# Patient Record
Sex: MALE | Race: OTHER | URBAN - METROPOLITAN AREA
[De-identification: names, ages, dates, MRNs, and addresses within clinical notes are randomized per-mention and may not be internally consistent; named-entity substitution may affect disease eponyms.]

---

## 2022-04-29 ENCOUNTER — LAB ENCOUNTER (OUTPATIENT)
Dept: LAB | Age: 22
End: 2022-04-29
Attending: STUDENT IN AN ORGANIZED HEALTH CARE EDUCATION/TRAINING PROGRAM
Payer: COMMERCIAL

## 2022-04-29 ENCOUNTER — OFFICE VISIT (OUTPATIENT)
Dept: FAMILY MEDICINE CLINIC | Facility: CLINIC | Age: 22
End: 2022-04-29
Payer: COMMERCIAL

## 2022-04-29 VITALS
TEMPERATURE: 98 F | RESPIRATION RATE: 20 BRPM | HEART RATE: 86 BPM | HEIGHT: 70 IN | BODY MASS INDEX: 30.61 KG/M2 | WEIGHT: 213.81 LBS

## 2022-04-29 DIAGNOSIS — Z13.89 SCREENING FOR GENITOURINARY CONDITION: ICD-10-CM

## 2022-04-29 DIAGNOSIS — Z13.220 SCREENING FOR LIPID DISORDERS: ICD-10-CM

## 2022-04-29 DIAGNOSIS — R40.4 EPISODES OF STARING: ICD-10-CM

## 2022-04-29 DIAGNOSIS — E66.09 CLASS 1 OBESITY DUE TO EXCESS CALORIES WITHOUT SERIOUS COMORBIDITY WITH BODY MASS INDEX (BMI) OF 30.0 TO 30.9 IN ADULT: ICD-10-CM

## 2022-04-29 DIAGNOSIS — Z13.0 SCREENING FOR IRON DEFICIENCY ANEMIA: ICD-10-CM

## 2022-04-29 DIAGNOSIS — Z23 NEED FOR VACCINATION: ICD-10-CM

## 2022-04-29 DIAGNOSIS — Z00.00 ROUTINE GENERAL MEDICAL EXAMINATION AT A HEALTH CARE FACILITY: ICD-10-CM

## 2022-04-29 DIAGNOSIS — Z00.00 ROUTINE GENERAL MEDICAL EXAMINATION AT A HEALTH CARE FACILITY: Primary | ICD-10-CM

## 2022-04-29 DIAGNOSIS — Z13.29 SCREENING FOR THYROID DISORDER: ICD-10-CM

## 2022-04-29 PROBLEM — E66.811 CLASS 1 OBESITY DUE TO EXCESS CALORIES WITHOUT SERIOUS COMORBIDITY WITH BODY MASS INDEX (BMI) OF 30.0 TO 30.9 IN ADULT: Status: ACTIVE | Noted: 2022-04-29

## 2022-04-29 LAB
ALBUMIN SERPL-MCNC: 4.4 G/DL (ref 3.4–5)
ALBUMIN/GLOB SERPL: 1.3 {RATIO} (ref 1–2)
ALP LIVER SERPL-CCNC: 53 U/L
ALT SERPL-CCNC: 116 U/L
ANION GAP SERPL CALC-SCNC: 4 MMOL/L (ref 0–18)
AST SERPL-CCNC: 57 U/L (ref 15–37)
BASOPHILS # BLD AUTO: 0.02 X10(3) UL (ref 0–0.2)
BASOPHILS NFR BLD AUTO: 0.2 %
BILIRUB SERPL-MCNC: 0.5 MG/DL (ref 0.1–2)
BILIRUB UR QL STRIP.AUTO: NEGATIVE
BUN BLD-MCNC: 17 MG/DL (ref 7–18)
CALCIUM BLD-MCNC: 9.5 MG/DL (ref 8.5–10.1)
CHLORIDE SERPL-SCNC: 106 MMOL/L (ref 98–112)
CHOLEST SERPL-MCNC: 185 MG/DL (ref ?–200)
CLARITY UR REFRACT.AUTO: CLEAR
CO2 SERPL-SCNC: 28 MMOL/L (ref 21–32)
COLOR UR AUTO: YELLOW
CREAT BLD-MCNC: 0.96 MG/DL
EOSINOPHIL # BLD AUTO: 0.13 X10(3) UL (ref 0–0.7)
EOSINOPHIL NFR BLD AUTO: 1.5 %
ERYTHROCYTE [DISTWIDTH] IN BLOOD BY AUTOMATED COUNT: 12.6 %
EST. AVERAGE GLUCOSE BLD GHB EST-MCNC: 120 MG/DL (ref 68–126)
FASTING PATIENT LIPID ANSWER: YES
FASTING STATUS PATIENT QL REPORTED: YES
GLOBULIN PLAS-MCNC: 3.5 G/DL (ref 2.8–4.4)
GLUCOSE BLD-MCNC: 82 MG/DL (ref 70–99)
GLUCOSE UR STRIP.AUTO-MCNC: NEGATIVE MG/DL
HBA1C MFR BLD: 5.8 % (ref ?–5.7)
HCT VFR BLD AUTO: 46.6 %
HDLC SERPL-MCNC: 33 MG/DL (ref 40–59)
HGB BLD-MCNC: 15.4 G/DL
IMM GRANULOCYTES # BLD AUTO: 0.02 X10(3) UL (ref 0–1)
IMM GRANULOCYTES NFR BLD: 0.2 %
KETONES UR STRIP.AUTO-MCNC: NEGATIVE MG/DL
LDLC SERPL CALC-MCNC: 127 MG/DL (ref ?–100)
LEUKOCYTE ESTERASE UR QL STRIP.AUTO: NEGATIVE
LYMPHOCYTES # BLD AUTO: 3.2 X10(3) UL (ref 1–4)
LYMPHOCYTES NFR BLD AUTO: 37.5 %
MCH RBC QN AUTO: 30.9 PG (ref 26–34)
MCHC RBC AUTO-ENTMCNC: 33 G/DL (ref 31–37)
MCV RBC AUTO: 93.4 FL
MONOCYTES # BLD AUTO: 0.74 X10(3) UL (ref 0.1–1)
MONOCYTES NFR BLD AUTO: 8.7 %
NEUTROPHILS # BLD AUTO: 4.43 X10 (3) UL (ref 1.5–7.7)
NEUTROPHILS # BLD AUTO: 4.43 X10(3) UL (ref 1.5–7.7)
NEUTROPHILS NFR BLD AUTO: 51.9 %
NITRITE UR QL STRIP.AUTO: NEGATIVE
NONHDLC SERPL-MCNC: 152 MG/DL (ref ?–130)
OSMOLALITY SERPL CALC.SUM OF ELEC: 287 MOSM/KG (ref 275–295)
PH UR STRIP.AUTO: 5 [PH] (ref 5–8)
PLATELET # BLD AUTO: 365 10(3)UL (ref 150–450)
POTASSIUM SERPL-SCNC: 4.2 MMOL/L (ref 3.5–5.1)
PROT SERPL-MCNC: 7.9 G/DL (ref 6.4–8.2)
PROT UR STRIP.AUTO-MCNC: NEGATIVE MG/DL
RBC # BLD AUTO: 4.99 X10(6)UL
SODIUM SERPL-SCNC: 138 MMOL/L (ref 136–145)
SP GR UR STRIP.AUTO: 1.02 (ref 1–1.03)
TRIGL SERPL-MCNC: 140 MG/DL (ref 30–149)
TSI SER-ACNC: 1.4 MIU/ML (ref 0.36–3.74)
UROBILINOGEN UR STRIP.AUTO-MCNC: <2 MG/DL
VLDLC SERPL CALC-MCNC: 25 MG/DL (ref 0–30)
WBC # BLD AUTO: 8.5 X10(3) UL (ref 4–11)

## 2022-04-29 PROCEDURE — 99385 PREV VISIT NEW AGE 18-39: CPT | Performed by: STUDENT IN AN ORGANIZED HEALTH CARE EDUCATION/TRAINING PROGRAM

## 2022-04-29 PROCEDURE — 90471 IMMUNIZATION ADMIN: CPT | Performed by: STUDENT IN AN ORGANIZED HEALTH CARE EDUCATION/TRAINING PROGRAM

## 2022-04-29 PROCEDURE — 90651 9VHPV VACCINE 2/3 DOSE IM: CPT | Performed by: STUDENT IN AN ORGANIZED HEALTH CARE EDUCATION/TRAINING PROGRAM

## 2022-04-29 PROCEDURE — 83036 HEMOGLOBIN GLYCOSYLATED A1C: CPT

## 2022-04-29 PROCEDURE — 80061 LIPID PANEL: CPT

## 2022-04-29 PROCEDURE — 81001 URINALYSIS AUTO W/SCOPE: CPT

## 2022-04-29 PROCEDURE — 80053 COMPREHEN METABOLIC PANEL: CPT

## 2022-04-29 PROCEDURE — 84443 ASSAY THYROID STIM HORMONE: CPT

## 2022-04-29 PROCEDURE — 85025 COMPLETE CBC W/AUTO DIFF WBC: CPT

## 2022-04-29 PROCEDURE — 3008F BODY MASS INDEX DOCD: CPT | Performed by: STUDENT IN AN ORGANIZED HEALTH CARE EDUCATION/TRAINING PROGRAM

## 2022-05-01 PROBLEM — R40.4 EPISODES OF STARING: Status: ACTIVE | Noted: 2022-05-01

## 2022-05-31 ENCOUNTER — TELEPHONE (OUTPATIENT)
Dept: FAMILY MEDICINE CLINIC | Facility: CLINIC | Age: 22
End: 2022-05-31

## 2022-05-31 DIAGNOSIS — Z23 NEED FOR VACCINATION AGAINST HUMAN PAPILLOMAVIRUS: Primary | ICD-10-CM

## 2022-06-01 ENCOUNTER — NURSE ONLY (OUTPATIENT)
Dept: FAMILY MEDICINE CLINIC | Facility: CLINIC | Age: 22
End: 2022-06-01
Payer: COMMERCIAL

## 2022-06-01 PROCEDURE — 90651 9VHPV VACCINE 2/3 DOSE IM: CPT | Performed by: STUDENT IN AN ORGANIZED HEALTH CARE EDUCATION/TRAINING PROGRAM

## 2022-06-01 PROCEDURE — 90471 IMMUNIZATION ADMIN: CPT | Performed by: STUDENT IN AN ORGANIZED HEALTH CARE EDUCATION/TRAINING PROGRAM

## 2022-06-27 ENCOUNTER — LAB ENCOUNTER (OUTPATIENT)
Dept: LAB | Age: 22
End: 2022-06-27
Attending: STUDENT IN AN ORGANIZED HEALTH CARE EDUCATION/TRAINING PROGRAM
Payer: COMMERCIAL

## 2022-06-27 DIAGNOSIS — R74.8 ELEVATED LIVER ENZYMES: ICD-10-CM

## 2022-06-27 LAB
ALBUMIN SERPL-MCNC: 4.1 G/DL (ref 3.4–5)
ALP LIVER SERPL-CCNC: 55 U/L
ALT SERPL-CCNC: 94 U/L
AST SERPL-CCNC: 49 U/L (ref 15–37)
BILIRUB DIRECT SERPL-MCNC: <0.1 MG/DL (ref 0–0.2)
BILIRUB SERPL-MCNC: 0.3 MG/DL (ref 0.1–2)
PROT SERPL-MCNC: 7.9 G/DL (ref 6.4–8.2)

## 2022-06-27 PROCEDURE — 80076 HEPATIC FUNCTION PANEL: CPT

## 2022-06-27 PROCEDURE — 36415 COLL VENOUS BLD VENIPUNCTURE: CPT

## 2022-06-29 DIAGNOSIS — R74.8 ELEVATED LIVER ENZYMES: Primary | ICD-10-CM

## 2022-07-29 ENCOUNTER — LAB ENCOUNTER (OUTPATIENT)
Dept: LAB | Age: 22
End: 2022-07-29
Attending: NURSE PRACTITIONER
Payer: COMMERCIAL

## 2022-07-29 DIAGNOSIS — R74.8 ELEVATED LIVER ENZYMES: ICD-10-CM

## 2022-07-29 LAB
ALBUMIN SERPL-MCNC: 4.1 G/DL (ref 3.4–5)
ALP LIVER SERPL-CCNC: 61 U/L
ALT SERPL-CCNC: 102 U/L
AST SERPL-CCNC: 46 U/L (ref 15–37)
BILIRUB DIRECT SERPL-MCNC: <0.1 MG/DL (ref 0–0.2)
BILIRUB SERPL-MCNC: 0.4 MG/DL (ref 0.1–2)
PROT SERPL-MCNC: 8.1 G/DL (ref 6.4–8.2)

## 2022-07-29 PROCEDURE — 80076 HEPATIC FUNCTION PANEL: CPT | Performed by: NURSE PRACTITIONER

## 2022-08-26 ENCOUNTER — HOSPITAL ENCOUNTER (OUTPATIENT)
Dept: ULTRASOUND IMAGING | Age: 22
Discharge: HOME | End: 2022-08-26
Attending: NURSE PRACTITIONER
Payer: COMMERCIAL

## 2022-08-26 DIAGNOSIS — R74.8 ELEVATED LIVER ENZYMES: ICD-10-CM

## 2022-08-26 PROCEDURE — 76700 US EXAM ABDOM COMPLETE: CPT | Performed by: NURSE PRACTITIONER

## 2022-09-01 ENCOUNTER — TELEPHONE (OUTPATIENT)
Dept: ADMINISTRATIVE | Age: 22
End: 2022-09-01

## 2022-09-06 ENCOUNTER — HOSPITAL ENCOUNTER (OUTPATIENT)
Dept: MRI IMAGING | Age: 22
Discharge: HOME | End: 2022-09-06
Attending: NURSE PRACTITIONER
Payer: COMMERCIAL

## 2022-09-06 DIAGNOSIS — R16.0 LIVER MASS: ICD-10-CM

## 2022-09-06 DIAGNOSIS — K76.0 FATTY INFILTRATION OF LIVER: ICD-10-CM

## 2022-09-06 PROCEDURE — 74183 MRI ABD W/O CNTR FLWD CNTR: CPT | Performed by: NURSE PRACTITIONER

## 2022-09-06 PROCEDURE — A9581 GADOXETATE DISODIUM INJ: HCPCS | Performed by: NURSE PRACTITIONER

## 2022-09-23 ENCOUNTER — LAB ENCOUNTER (OUTPATIENT)
Dept: LAB | Age: 22
End: 2022-09-23
Attending: STUDENT IN AN ORGANIZED HEALTH CARE EDUCATION/TRAINING PROGRAM

## 2022-09-23 ENCOUNTER — OFFICE VISIT (OUTPATIENT)
Dept: FAMILY MEDICINE CLINIC | Facility: CLINIC | Age: 22
End: 2022-09-23

## 2022-09-23 VITALS
BODY MASS INDEX: 30.49 KG/M2 | SYSTOLIC BLOOD PRESSURE: 110 MMHG | WEIGHT: 213 LBS | HEART RATE: 88 BPM | HEIGHT: 70 IN | DIASTOLIC BLOOD PRESSURE: 70 MMHG | RESPIRATION RATE: 20 BRPM | TEMPERATURE: 98 F

## 2022-09-23 DIAGNOSIS — K76.89 FOCAL NODULAR HYPERPLASIA OF LIVER: ICD-10-CM

## 2022-09-23 DIAGNOSIS — K76.0 NAFLD (NONALCOHOLIC FATTY LIVER DISEASE): ICD-10-CM

## 2022-09-23 DIAGNOSIS — K76.0 NAFLD (NONALCOHOLIC FATTY LIVER DISEASE): Primary | ICD-10-CM

## 2022-09-23 PROCEDURE — 86803 HEPATITIS C AB TEST: CPT

## 2022-09-23 PROCEDURE — 87340 HEPATITIS B SURFACE AG IA: CPT

## 2022-09-23 PROCEDURE — 86706 HEP B SURFACE ANTIBODY: CPT

## 2022-09-23 PROCEDURE — 3074F SYST BP LT 130 MM HG: CPT | Performed by: STUDENT IN AN ORGANIZED HEALTH CARE EDUCATION/TRAINING PROGRAM

## 2022-09-23 PROCEDURE — 86709 HEPATITIS A IGM ANTIBODY: CPT

## 2022-09-23 PROCEDURE — 3078F DIAST BP <80 MM HG: CPT | Performed by: STUDENT IN AN ORGANIZED HEALTH CARE EDUCATION/TRAINING PROGRAM

## 2022-09-23 PROCEDURE — 86708 HEPATITIS A ANTIBODY: CPT

## 2022-09-23 PROCEDURE — 3008F BODY MASS INDEX DOCD: CPT | Performed by: STUDENT IN AN ORGANIZED HEALTH CARE EDUCATION/TRAINING PROGRAM

## 2022-09-23 PROCEDURE — 99214 OFFICE O/P EST MOD 30 MIN: CPT | Performed by: STUDENT IN AN ORGANIZED HEALTH CARE EDUCATION/TRAINING PROGRAM

## 2022-09-24 LAB
HAV AB SER QL IA: REACTIVE
HAV IGM SER QL: NONREACTIVE
HBV SURFACE AB SER QL: NONREACTIVE
HBV SURFACE AB SERPL IA-ACNC: <3.1 MIU/ML
HBV SURFACE AG SER-ACNC: <0.1 [IU]/L
HBV SURFACE AG SERPL QL IA: NONREACTIVE
HCV AB SERPL QL IA: NONREACTIVE

## 2022-11-01 ENCOUNTER — TELEPHONE (OUTPATIENT)
Dept: FAMILY MEDICINE CLINIC | Facility: CLINIC | Age: 22
End: 2022-11-01

## 2022-11-01 DIAGNOSIS — Z23 NEED FOR VACCINATION: Primary | ICD-10-CM

## 2022-11-01 NOTE — TELEPHONE ENCOUNTER
Lm on vm to cb. Please let pt know that it is the Hep B that he needs, not Hep A. Thank you. Then to Dr. Vesna Mcdonough to sign orders. Per labs done 9/23/2022:    He has antibodies against Hep A, so no need for vaccination against Hep A. He does not have protection against Hep B, so I recommend Hep B vaccination to protect the liver since it is more susceptible to infection [place]. Please see pended orders and approve if appropriate. Thank you.

## 2022-11-11 ENCOUNTER — NURSE ONLY (OUTPATIENT)
Dept: FAMILY MEDICINE CLINIC | Facility: CLINIC | Age: 22
End: 2022-11-11
Payer: COMMERCIAL

## 2022-11-11 PROCEDURE — 90651 9VHPV VACCINE 2/3 DOSE IM: CPT | Performed by: STUDENT IN AN ORGANIZED HEALTH CARE EDUCATION/TRAINING PROGRAM

## 2022-11-11 PROCEDURE — 90471 IMMUNIZATION ADMIN: CPT | Performed by: STUDENT IN AN ORGANIZED HEALTH CARE EDUCATION/TRAINING PROGRAM

## 2022-11-11 PROCEDURE — 90472 IMMUNIZATION ADMIN EACH ADD: CPT | Performed by: STUDENT IN AN ORGANIZED HEALTH CARE EDUCATION/TRAINING PROGRAM

## 2022-11-11 PROCEDURE — 90746 HEPB VACCINE 3 DOSE ADULT IM: CPT | Performed by: STUDENT IN AN ORGANIZED HEALTH CARE EDUCATION/TRAINING PROGRAM

## 2022-11-11 NOTE — PROGRESS NOTES
Pt presented to receive HPV and Hep B vaccine. HPV was given in Left Deltoid and Hep B was given in his right deltoid. Patient tolerated both shots well and was informed to return in 6 months for his second dose.

## 2023-02-08 ENCOUNTER — OFFICE VISIT (OUTPATIENT)
Dept: FAMILY MEDICINE CLINIC | Facility: CLINIC | Age: 23
End: 2023-02-08
Payer: COMMERCIAL

## 2023-02-08 VITALS
DIASTOLIC BLOOD PRESSURE: 80 MMHG | RESPIRATION RATE: 18 BRPM | SYSTOLIC BLOOD PRESSURE: 102 MMHG | OXYGEN SATURATION: 99 % | BODY MASS INDEX: 31.5 KG/M2 | WEIGHT: 220 LBS | TEMPERATURE: 98 F | HEART RATE: 79 BPM | HEIGHT: 70 IN

## 2023-02-08 DIAGNOSIS — H65.03 NON-RECURRENT ACUTE SEROUS OTITIS MEDIA OF BOTH EARS: ICD-10-CM

## 2023-02-08 DIAGNOSIS — J01.10 ACUTE NON-RECURRENT FRONTAL SINUSITIS: Primary | ICD-10-CM

## 2023-02-08 PROCEDURE — 99214 OFFICE O/P EST MOD 30 MIN: CPT | Performed by: STUDENT IN AN ORGANIZED HEALTH CARE EDUCATION/TRAINING PROGRAM

## 2023-02-08 PROCEDURE — 3074F SYST BP LT 130 MM HG: CPT | Performed by: STUDENT IN AN ORGANIZED HEALTH CARE EDUCATION/TRAINING PROGRAM

## 2023-02-08 PROCEDURE — 3079F DIAST BP 80-89 MM HG: CPT | Performed by: STUDENT IN AN ORGANIZED HEALTH CARE EDUCATION/TRAINING PROGRAM

## 2023-02-08 PROCEDURE — 3008F BODY MASS INDEX DOCD: CPT | Performed by: STUDENT IN AN ORGANIZED HEALTH CARE EDUCATION/TRAINING PROGRAM

## 2023-02-08 RX ORDER — AMOXICILLIN AND CLAVULANATE POTASSIUM 875; 125 MG/1; MG/1
1 TABLET, FILM COATED ORAL 2 TIMES DAILY
Qty: 20 TABLET | Refills: 0 | Status: SHIPPED | OUTPATIENT
Start: 2023-02-08 | End: 2023-02-18

## 2024-01-22 ENCOUNTER — OFFICE VISIT (OUTPATIENT)
Dept: FAMILY MEDICINE CLINIC | Facility: CLINIC | Age: 24
End: 2024-01-22
Payer: COMMERCIAL

## 2024-01-22 VITALS
HEART RATE: 96 BPM | WEIGHT: 220 LBS | BODY MASS INDEX: 31.5 KG/M2 | OXYGEN SATURATION: 98 % | DIASTOLIC BLOOD PRESSURE: 72 MMHG | SYSTOLIC BLOOD PRESSURE: 128 MMHG | TEMPERATURE: 98 F | HEIGHT: 70 IN

## 2024-01-22 DIAGNOSIS — R09.81 NASAL CONGESTION: ICD-10-CM

## 2024-01-22 DIAGNOSIS — J02.9 SORE THROAT: ICD-10-CM

## 2024-01-22 DIAGNOSIS — H69.91 ACUTE DYSFUNCTION OF RIGHT EUSTACHIAN TUBE: ICD-10-CM

## 2024-01-22 DIAGNOSIS — J01.00 ACUTE NON-RECURRENT MAXILLARY SINUSITIS: Primary | ICD-10-CM

## 2024-01-22 LAB
CONTROL LINE PRESENT WITH A CLEAR BACKGROUND (YES/NO): YES YES/NO
COVID19 BINAX NOW ANTIGEN: NOT DETECTED
KIT EXPIRATION DATE: NORMAL DATE
KIT LOT #: NORMAL NUMERIC
POCT EXPIRATION DATE: NORMAL

## 2024-01-22 PROCEDURE — 87637 SARSCOV2&INF A&B&RSV AMP PRB: CPT

## 2024-01-22 PROCEDURE — 87081 CULTURE SCREEN ONLY: CPT

## 2024-01-22 RX ORDER — AMOXICILLIN AND CLAVULANATE POTASSIUM 875; 125 MG/1; MG/1
1 TABLET, FILM COATED ORAL 2 TIMES DAILY
Qty: 20 TABLET | Refills: 0 | Status: SHIPPED | OUTPATIENT
Start: 2024-01-22 | End: 2024-02-01

## 2024-01-22 NOTE — PATIENT INSTRUCTIONS
Take the antibiotic with food.  Take a probiotic while you are on the antibiotic, such as Align (this is a capsule that is available over-the-counter), or organic yogurt  Drink plenty of fluids and electrolytes.    May continue symptomatic treatment as needed unless otherwise contraindicated including but not limited to:   Mucinex DM or Robitussin DM as needed  Nasal saline rinse such as Ocean nasal spray  Tylenol or Motrin as needed.   Oral antihistamine such as Zyrtec/Allegra/Xyzal or nasal Astepro.   Nasal steroid such as Flonase or Nasacort.      Schedule annual physical with

## 2024-01-22 NOTE — PROGRESS NOTES
[facility] Family [facility] Office Note  Chief Complaint:   Chief Complaint   Patient presents with    Nasal Congestion     Congestion/ runny nose & sore throat all started 1/17/24 dayquil helps a little   Neg covid 1/19/24       HPI:   This is a 23 year old male coming in for nasal congestion, sinus pressure, sore throat, and cough since 1/17. Also experiencing pressure in right ear. Cough is dry. Denies any shortness of breath, wheezing, chest tightness, or fevers.     Reports he took a home covid test on 1/19 which was negative. His fiance is sick with similar symptoms. Denies any other known exposures.     OTC: [place], Dayquil,    History reviewed. No pertinent past medical history.  History reviewed. No pertinent surgical history.  Social History:  Social History     Socioeconomic History    Marital status: Single   Tobacco Use    Smoking status: Never    Smokeless tobacco: Never   Vaping Use    Vaping Use: Never used   Substance and Sexual Activity    Alcohol use: Yes     Comment: varies1 a year    Drug use: Never   Other Topics Concern    Caffeine Concern Yes    Exercise Yes    Seat Belt Yes     Family History:  History reviewed. No pertinent family history.  Allergies:  Allergies   Allergen Reactions    Cherry Coughing     Current Meds:  Current Outpatient Medications   Medication Sig Dispense Refill    amoxicillin clavulanate 875-125 MG Oral Tab Take 1 tablet by mouth 2 (two) times daily for 10 days. 20 tablet 0      Counseling given: Not Answered       REVIEW OF SYSTEMS:   Review of Systems   Constitutional: Negative.  Negative for chills and fatigue.   HENT:  Positive for congestion, ear pain, postnasal drip, rhinorrhea, sinus pressure and sore throat. Negative for ear discharge.    Eyes: Negative.    Respiratory:  Positive for cough. Negative for chest tightness, shortness of breath and wheezing.    Cardiovascular: Negative.    Gastrointestinal: Negative.    Endocrine: Negative.     Genitourinary: Negative.    Musculoskeletal: Negative.    Skin: Negative.    Allergic/Immunologic: Positive for food allergies.   Neurological: Negative.    Hematological: Negative.    Psychiatric/Behavioral: Negative.           EXAM:   /72 (BP Location: Left arm, Patient Position: Sitting, Cuff Size: adult)   Pulse 96   Temp 98.2 °F (36.8 °C) (Oral)   Ht 5' 10\" (1.778 m)   Wt 220 lb (99.8 kg)   SpO2 98%   BMI 31.57 kg/m²  Estimated body mass index is 31.57 kg/m² as calculated from the following:    Height as of this encounter: 5' 10\" (1.778 m).    Weight as of this encounter: 220 lb (99.8 kg).   Vital signs reviewed.Appears stated age, well groomed.  Physical Exam  Constitutional:       Appearance: Normal appearance.   HENT:      Head: Normocephalic and atraumatic.      Right Ear: Ear canal and external ear normal. Tympanic membrane is not injected or erythematous.      Left Ear: Tympanic membrane, ear canal and external ear normal.      Ears:      Comments: +clear fluid present behind right TM     Nose: Congestion and rhinorrhea present.      Right Sinus: Maxillary sinus tenderness and frontal sinus tenderness present.      Left Sinus: Maxillary sinus tenderness and frontal sinus tenderness present.      Mouth/Throat:      Mouth: Mucous membranes are moist.      Pharynx: Posterior oropharyngeal erythema present. No oropharyngeal exudate.   Eyes:      Extraocular Movements: Extraocular movements intact.      Conjunctiva/sclera: Conjunctivae normal.      Pupils: Pupils are equal, round, and reactive to light.   Cardiovascular:      Rate and Rhythm: Normal rate and regular rhythm.      Pulses: Normal pulses.      Heart sounds: Normal heart sounds. No murmur heard.  Pulmonary:      Effort: Pulmonary effort is normal.      Breath sounds: Normal breath sounds. No wheezing, rhonchi or rales.   Musculoskeletal:      Cervical back: Normal range of motion. No tenderness.   Lymphadenopathy:      Cervical: No  cervical adenopathy.   Skin:     General: Skin is warm and dry.      Capillary Refill: Capillary refill takes less than 2 seconds.   Neurological:      General: No focal deficit present.      Mental Status: He is alert and oriented to person, place, and time.   Psychiatric:         Mood and Affect: Mood normal.         Behavior: Behavior normal.         Results for orders placed or performed in visit on 01/22/24   Rapid Strep   Result Value Ref Range    Strep Grp A Screen neg Negative    Control Line Present with a clear background (yes/no) yes Yes/No    Kit Lot # [id number] Numeric    Kit Expiration Date [id number] Date   COVID19 BinaxNOW Antigen   Result Value Ref Range    COVID19 Binax Now Antigen Not Detected Not Detected    POCT Lot Number [id number]     POCT Expiration Date [id number]     POCT Procedure Control Control Valid Control Valid     ID MS          ASSESSMENT AND PLAN:     1. Acute non-recurrent maxillary sinusitis    2. Sore throat    3. Acute dysfunction of right eustachian tube      Rapid strep and covid completed in office--negative.  Will send out respiratory panel and throat culture.   Will start Augmentin BID x 10 days given associated symptoms of sinus pain, pressure, and persistent nasal congestion.   Take the antibiotic with food.  Take a probiotic while you are on the antibiotic, such as Align (this is a capsule that is available over-the-counter), or organic yogurt  Drink plenty of fluids and electrolytes.    May continue symptomatic treatment as needed unless otherwise contraindicated including but not limited to:   Mucinex DM or Robitussin DM as needed  Nasal saline rinse such as Ocean nasal spray  Tylenol or Motrin as needed.   Oral antihistamine such as Zyrtec/Allegra/Xyzal or nasal Astepro.   Nasal steroid such as Flonase or Nasacort.      Schedule annual physical with        Return for Schedule annual physical with  .    Meds & Refills for this  Visit:  Requested Prescriptions     Signed Prescriptions Disp Refills    amoxicillin clavulanate 875-125 MG Oral Tab 20 tablet 0     Sig: Take 1 tablet by mouth 2 (two) times daily for 10 days.       Health Maintenance:  Health Maintenance Due   Topic Date Due    DTaP,Tdap,and Td Vaccines (1 - Tdap) Never done    Annual Physical  04/29/2023    COVID-19 Vaccine (4 - 2023-24 season) 09/01/2023    Influenza Vaccine (1) 10/01/2023    Annual Depression Screening  01/01/2024       Patient/Caregiver Education: Patient/Caregiver Education: There are no barriers to learning. Medical education done.   Outcome: Patient verbalizes understanding. Patient is notified to call with any questions, complications, allergies, or worsening or changing symptoms.  Patient is to call with any side effects or complications from the treatments as a result of today.     Problem List:  Patient Active Problem List   Diagnosis    Class 1 obesity due to excess calories without serious comorbidity with body mass index (BMI) of 30.0 to 30.9 in adult    Episodes of staring       AKIKO Duff    Please note that portions of this note may have been completed with a voice recognition program. Efforts were made to edit the dictations but occasionally words are mis-transcribed.    The 21st Century Cures Act makes medical notes like these available to patients in the interest of transparency. Please be advised this is a medical document. Medical documents are intended to carry relevant information, facts as evident, and the clinical opinion of the practitioner. The medical note is intended as peer to peer communication and may appear blunt or direct. It is written in medical language and may contain abbreviations or verbiage that are unfamiliar.

## 2024-01-23 LAB
FLUAV + FLUBV RNA SPEC NAA+PROBE: NOT DETECTED
FLUAV + FLUBV RNA SPEC NAA+PROBE: NOT DETECTED
RSV RNA SPEC NAA+PROBE: NOT DETECTED
SARS-COV-2 RNA RESP QL NAA+PROBE: NOT DETECTED

## 2024-02-26 ENCOUNTER — HOSPITAL ENCOUNTER (OUTPATIENT)
Age: 24
Discharge: HOME | End: 2024-02-26
Payer: COMMERCIAL

## 2024-02-26 ENCOUNTER — APPOINTMENT (OUTPATIENT)
Dept: GENERAL RADIOLOGY | Age: 24
End: 2024-02-26
Attending: PHYSICIAN ASSISTANT
Payer: COMMERCIAL

## 2024-02-26 ENCOUNTER — PATIENT MESSAGE (OUTPATIENT)
Dept: FAMILY MEDICINE CLINIC | Facility: CLINIC | Age: 24
End: 2024-02-26

## 2024-02-26 VITALS
DIASTOLIC BLOOD PRESSURE: 93 MMHG | WEIGHT: 220 LBS | OXYGEN SATURATION: 99 % | TEMPERATURE: 98 F | HEART RATE: 100 BPM | RESPIRATION RATE: 22 BRPM | BODY MASS INDEX: 32 KG/M2 | SYSTOLIC BLOOD PRESSURE: 141 MMHG

## 2024-02-26 DIAGNOSIS — J40 BRONCHITIS: ICD-10-CM

## 2024-02-26 DIAGNOSIS — U07.1 COVID-19: Primary | ICD-10-CM

## 2024-02-26 PROCEDURE — 94664 DEMO&/EVAL PT USE INHALER: CPT

## 2024-02-26 PROCEDURE — 99213 OFFICE O/P EST LOW 20 MIN: CPT

## 2024-02-26 PROCEDURE — 99204 OFFICE O/P NEW MOD 45 MIN: CPT

## 2024-02-26 PROCEDURE — 71046 X-RAY EXAM CHEST 2 VIEWS: CPT | Performed by: PHYSICIAN ASSISTANT

## 2024-02-26 PROCEDURE — 99215 OFFICE O/P EST HI 40 MIN: CPT

## 2024-02-26 RX ORDER — ALBUTEROL SULFATE 90 UG/1
2 AEROSOL, METERED RESPIRATORY (INHALATION) EVERY 4 HOURS PRN
Qty: 1 EACH | Refills: 0 | Status: SHIPPED | OUTPATIENT
Start: 2024-02-26 | End: 2024-03-27

## 2024-02-26 NOTE — ED INITIAL ASSESSMENT (HPI)
C/o cough and nasal congestion for a week. Tested positive for Covid on Tuesday.  Having shortness of breath and getting worse.

## 2024-02-27 NOTE — ED PROVIDER NOTES
Patient Seen in: [facility]      History     Chief Complaint   Patient presents with    Covid     Stated Complaint: Covid    Subjective:   HPI    Nolan is a 23-year-old male who presents today for evaluation of cough and shortness of breath in association with COVID-19.  He denies past medical history.  He states that he was diagnosed with COVID about 5 days ago.  He states that he has been more aware of his breathing and feels like he is not breathing normally.  He has been taking over-the-counter cough medicine with some relief.  He denies fever in the last 24 hours, nausea and vomiting.    Objective:   History reviewed. No pertinent past medical history.           History reviewed. No pertinent surgical history.             Social History     Socioeconomic History    Marital status: Single   Tobacco Use    Smoking status: Never    Smokeless tobacco: Never   Vaping Use    Vaping Use: Never used   Substance and Sexual Activity    Alcohol use: Yes     Comment: varies1 a year    Drug use: Never   Other Topics Concern    Caffeine Concern Yes    Exercise Yes    Seat Belt Yes              Review of Systems    Positive for stated complaint: Covid  Other systems are as noted in HPI.  Constitutional and vital signs reviewed.      All other systems reviewed and negative except as noted above.    Physical Exam     ED Triage Vitals [02/26/24 1625]   BP (!) 141/93   Pulse 100   Resp 22   Temp 98 °F (36.7 °C)   Temp src Temporal   SpO2 99 %   O2 Device None (Room air)       Current:BP (!) 141/93   Pulse 100   Temp 98 °F (36.7 °C) (Temporal)   Resp 22   Wt 99.8 kg   SpO2 99%   BMI 31.57 kg/m²         Physical Exam    Constitutional: Oriented to person, place, and time. Patient appears well-developed and well-nourished, non-toxic and in no acute distress.  Head: Normocephalic and atraumatic.   Eyes: PERRLA, EOMI, no periorbital edema, anicteric, normal conjunctiva  Ears: External: Non-tender, normal in  appearance; canals clear; TM normal in appearance with landmarks visualized.  Nose: Bilateral nasal turbinates congested with clear discharge noted, sinuses non-tender to palpation.  Mouth/Throat: MMM, no oral lesions, tongue normal in size, post pharynx with mild erythema and clear postnasal drip.  Normal tonsils without exudate or abscess, uvula midline.  Neck: trachea midline, no cervical LAD, full AROM of neck without pain.   Cardiovascular: Normal rate, regular rhythm, normal heart sounds and intact distal pulses.    Pulmonary/Chest: Effort normal and breath sounds normal. Lungs CTA.  Neurological: CN III - XII grossly intact, normal strength and sensation.   Skin: Skin is warm and dry. No rash noted. No erythema.    ED Course   Labs Reviewed - No data to display  Imaging ordered and independently visualized and interpreted by myself, along with review of radiologist's interpretation and noted the following: No consolidation.  XR CHEST PA + LAT CHEST (CPT=71046)  CONCLUSION:  No active cardiopulmonary process identified.   LOCATION:  [facility]   Dictated by (CST): Stromberg, LeRoy, MD on 2/26/2024 at 6:22 PM     Finalized by (CST): Stromberg, LeRoy, MD on 2/26/2024 at 6:23 PM        Patient is informed of his negative x-ray.  He is given a space chamber and will be prescribed albuterol for symptom relief.  Typical course discussed.  Return precautions given.  He expresses understanding and agrees with the plan.                     [facility]             Medical Decision Making  Patient is a 23-year-old male who presents with known COVID for cough and shortness of breath.  Differential diagnosis includes, but is not limited to COVID-19, pneumonia, bronchitis, and other potentially life-threatening processes.  Patient has known COVID-19, chest x-ray is negative for pneumonia.  Will be discharged home with albuterol and spacer chamber.  Supportive management discussed.  Typical course discussed.  Return precautions given.   He expresses understanding and agrees with plan.    Amount and/or Complexity of Data Reviewed  Radiology: ordered and independent interpretation performed. Decision-making details documented in ED Course.    Risk  OTC drugs.  Prescription drug management.        Disposition and Plan     Clinical Impression:  1. COVID-19    2. Bronchitis         Disposition:  Discharge  2/26/2024  6:37 pm    Follow-up:  Quita Soni MD  [address] [place] [id number]  [phone]                Medications Prescribed:  Discharge Medication List as of 2/26/2024  6:39 PM        START taking these medications    Details   albuterol 108 (90 Base) MCG/ACT Inhalation Aero Soln Inhale 2 puffs into the lungs every 4 (four) hours as needed for Wheezing., Print, Disp-1 each, R-0

## 2024-02-27 NOTE — TELEPHONE ENCOUNTER
Routing to provider. Please see below. Pt was seen in UC yesterday. Pt is asking if you can review UC notes/results. Does Pt needs UC follow up? If so, can you work patient in tomorrow, or okay to schedule with uSzan WHARTON? Thanks.

## 2024-02-27 NOTE — DISCHARGE INSTRUCTIONS
Your test result is not yet available.      -  If you had a close exposure with a postive COVID patient, monitor for symptoms for 14 days from exposure.  If symptoms develop, immediately self isolate and return for repeat testing.  Typically symptoms develop 5-7 days from exposure but my develop up to 14 days from exposure.        - Currently, CDC recommends quarantine for 14 days from exposure to a COVID positive individual.      - If your test is positive, you will have to self isolate for 10 days from symptom onset.  If you are symptom free, you isolate for 10 days from positive test.       -If you are fully vaccinated, you should quarantine for 5-7 days from exposure and get tested.  Continue to monitor for symptoms.    Alternate 600 mg of ibuprofen (3 x 200mg Advil tablets) with 1 g of acetaminophen (2 x 500mg of extra strength tylenol tablets) every 4 hours for fever and aches/pains.

## 2024-02-27 NOTE — TELEPHONE ENCOUNTER
From: Nolan Martínez  To: Quita Soni  Sent: 2/26/2024 9:18 PM CST  Subject: Immediate Care Results    Hello,    I went to the [facility] here in [place]. I wanted to reach out due to some concerns. I went in due to having Covid since 02/20, and I've been having more troubles breathing. They told me my lungs sounded fine and said nothing came up in my chest x ray. I was prescribed Albuterol to help. When I got home and checked my after visit summary, I was diagnosed with Bronchitis. They didn't say anything regarding this while I was there. They kind of rushed us out. They mentioned my blood pressure being high, but did not come back to it. Granted, i was a bit anxious of being there. They didn't ask about what medication I was taking, only if I had prescribed medication. I have been taking dayquil/nyquil, Synex decongestant, Allegra to help with congestion, a Saline spray, and acetaminophen. They are not taken all at once. I stopped with the dayquil since Friday. I also stopped the decongestant as well in fear of rebound congestion. Is the way I'm medicating alright? Should I be taking   something else instead? Should I be concerned about the bronchitis? Could you review my results and summary and provide your input, please?    Regards,    Nolan

## 2024-02-28 NOTE — TELEPHONE ENCOUNTER
Covid symptoms can take 7-10 days to improve and then the cough can last 6-8 weeks afterwards. Bronchitis is inflammation of the airways which can happen in Covid-19 infection. Should have urgent care follow up 10 days after testing positive for covid. If worsening symptoms then patient should go to the ER for further evaluation. I typically recommend mucinex DM for the cough/congestion along with flonase nasal spray and/or astepro nasal spray and one of the following zyrtec/allegra/xyzal to help improve symptoms. Since they sent an inhaler I recommend using as needed every 4 hours - if needing to use sooner than 4 hours then patient should go to the ER. Thank you.

## 2024-09-24 ENCOUNTER — MED REC SCAN ONLY (OUTPATIENT)
Dept: FAMILY MEDICINE CLINIC | Facility: CLINIC | Age: 24
End: 2024-09-24

## 2024-10-21 ENCOUNTER — MED REC SCAN ONLY (OUTPATIENT)
Dept: FAMILY MEDICINE CLINIC | Facility: CLINIC | Age: 24
End: 2024-10-21

## 2025-01-20 ENCOUNTER — TELEPHONE (OUTPATIENT)
Dept: FAMILY MEDICINE CLINIC | Facility: CLINIC | Age: 25
End: 2025-01-20

## 2025-01-20 ENCOUNTER — HOSPITAL ENCOUNTER (OUTPATIENT)
Dept: GENERAL RADIOLOGY | Age: 25
Discharge: HOME | End: 2025-01-20
Payer: COMMERCIAL

## 2025-01-20 ENCOUNTER — OFFICE VISIT (OUTPATIENT)
Dept: FAMILY MEDICINE CLINIC | Facility: CLINIC | Age: 25
End: 2025-01-20
Payer: COMMERCIAL

## 2025-01-20 VITALS
DIASTOLIC BLOOD PRESSURE: 85 MMHG | WEIGHT: 228 LBS | BODY MASS INDEX: 32.64 KG/M2 | HEART RATE: 107 BPM | OXYGEN SATURATION: 97 % | HEIGHT: 70 IN | SYSTOLIC BLOOD PRESSURE: 127 MMHG | TEMPERATURE: 97 F | RESPIRATION RATE: 14 BRPM

## 2025-01-20 DIAGNOSIS — R07.89 CHEST TIGHTNESS: ICD-10-CM

## 2025-01-20 DIAGNOSIS — R05.9 COUGH, UNSPECIFIED TYPE: ICD-10-CM

## 2025-01-20 DIAGNOSIS — J02.9 SORE THROAT: ICD-10-CM

## 2025-01-20 DIAGNOSIS — R09.81 NASAL CONGESTION: ICD-10-CM

## 2025-01-20 DIAGNOSIS — M54.6 ACUTE THORACIC BACK PAIN, UNSPECIFIED BACK PAIN LATERALITY: ICD-10-CM

## 2025-01-20 DIAGNOSIS — J01.90 ACUTE NON-RECURRENT SINUSITIS, UNSPECIFIED LOCATION: Primary | ICD-10-CM

## 2025-01-20 LAB
CONTROL LINE PRESENT WITH A CLEAR BACKGROUND (YES/NO): YES YES/NO
COVID19 BINAX NOW ANTIGEN: NOT DETECTED
KIT LOT #: NORMAL NUMERIC
OPERATOR ID: NORMAL
POCT LOT NUMBER: NORMAL
STREP GRP A CUL-SCR: NEGATIVE

## 2025-01-20 PROCEDURE — 3074F SYST BP LT 130 MM HG: CPT

## 2025-01-20 PROCEDURE — 87880 STREP A ASSAY W/OPTIC: CPT

## 2025-01-20 PROCEDURE — 3008F BODY MASS INDEX DOCD: CPT

## 2025-01-20 PROCEDURE — 99214 OFFICE O/P EST MOD 30 MIN: CPT

## 2025-01-20 PROCEDURE — 3079F DIAST BP 80-89 MM HG: CPT

## 2025-01-20 PROCEDURE — 71046 X-RAY EXAM CHEST 2 VIEWS: CPT

## 2025-01-20 RX ORDER — ALBUTEROL SULFATE 90 UG/1
2 INHALANT RESPIRATORY (INHALATION)
Qty: 1 EACH | Refills: 0 | Status: SHIPPED | OUTPATIENT
Start: 2025-01-20

## 2025-01-20 NOTE — PROGRESS NOTES
[facility] Family Medicine Office Note  Chief Complaint:   Chief Complaint   Patient presents with    Sore Throat     Sore throat, cough,nasal congestion , fatigue , chest pain X 1 week.       HPI:   This is a 24 year old male coming in for symptoms of sore throat, cough, nasal congestion, and fatigue which began about 1 week ago. Cough is primarily dry. He is experiencing exertional shortness of breath and chest tightness. At times he has chest pain which radiates into the back when coughing. Denies fevers or chills. He has been using Dayquil, Nyquil, and a nasal spray which provide little relief.     His partner was recently sick and treated with an antibiotic for sinusitis and bronchitis.     Patient Active Problem List   Diagnosis    Class 1 obesity due to excess calories without serious comorbidity with body mass index (BMI) of 30.0 to 30.9 in adult    Episodes of staring     History reviewed. No pertinent past medical history.  History reviewed. No pertinent surgical history.  Social History:  Social History     Socioeconomic History    Marital status: Single   Tobacco Use    Smoking status: Never    Smokeless tobacco: Never   Vaping Use    Vaping status: Never Used   Substance and Sexual Activity    Alcohol use: Yes     Comment: varies1 a year    Drug use: Never   Other Topics Concern    Caffeine Concern Yes    Exercise Yes    Seat Belt Yes     Family History:  History reviewed. No pertinent family history.  Allergies:  Allergies[1]  Current Meds:  Current Outpatient Medications   Medication Sig Dispense Refill    albuterol 108 (90 Base) MCG/ACT Inhalation Aero Soln Inhale 2 puffs into the lungs every 4 to 6 hours as needed for Wheezing or Shortness of Breath. 1 each 0    amoxicillin clavulanate 875-125 MG Oral Tab Take 1 tablet by mouth 2 (two) times daily for 10 days. 20 tablet 0      Counseling given: Not Answered       REVIEW OF SYSTEMS:   Review of Systems   Constitutional:  Positive for  fatigue. Negative for chills and fever.   HENT:  Positive for congestion, rhinorrhea, sinus pressure and sore throat.    Eyes: Negative.    Respiratory:  Positive for cough, chest tightness (with coughing) and shortness of breath (exertional).    Cardiovascular:  Positive for chest pain (with coughing).   Gastrointestinal: Negative.    Endocrine: Negative.    Genitourinary: Negative.    Musculoskeletal: Negative.    Skin: Negative.    Neurological: Negative.    Hematological: Negative.    Psychiatric/Behavioral: Negative.           EXAM:   /85 (BP Location: Left arm, Patient Position: Sitting, Cuff Size: adult)   Pulse 107   Temp 97 °F (36.1 °C) (Temporal)   Resp 14   Ht 5' 10\" (1.778 m)   Wt 228 lb (103.4 kg)   SpO2 97%   BMI 32.71 kg/m²  Estimated body mass index is 32.71 kg/m² as calculated from the following:    Height as of this encounter: 5' 10\" (1.778 m).    Weight as of this encounter: 228 lb (103.4 kg).   Vital signs reviewed.Appears stated age, well groomed.  Physical Exam  Constitutional:       Appearance: Normal appearance.   HENT:      Head: Normocephalic and atraumatic.      Right Ear: Tympanic membrane normal.      Left Ear: Tympanic membrane normal.      Nose: Congestion and rhinorrhea present.      Right Sinus: Maxillary sinus tenderness and frontal sinus tenderness present.      Left Sinus: Maxillary sinus tenderness and frontal sinus tenderness present.      Mouth/Throat:      Mouth: Mucous membranes are moist.      Pharynx: Posterior oropharyngeal erythema and postnasal drip present. No oropharyngeal exudate.   Eyes:      Extraocular Movements: Extraocular movements intact.      Conjunctiva/sclera: Conjunctivae normal.      Pupils: Pupils are equal, round, and reactive to light.   Cardiovascular:      Rate and Rhythm: Normal rate and regular rhythm.      Pulses: Normal pulses.      Heart sounds: Normal heart sounds. No murmur heard.     No friction rub. No gallop.   Pulmonary:       Effort: Pulmonary effort is normal.      Breath sounds: Decreased air movement present. Examination of the right-upper field reveals decreased breath sounds. Examination of the left-upper field reveals decreased breath sounds. Decreased breath sounds present. No wheezing or rhonchi.   Lymphadenopathy:      Cervical: No cervical adenopathy.   Skin:     General: Skin is warm and dry.      Capillary Refill: Capillary refill takes less than 2 seconds.   Neurological:      General: No focal deficit present.      Mental Status: He is alert and oriented to person, place, and time. Mental status is at baseline.      Cranial Nerves: Cranial nerves 2-12 are intact.      Sensory: Sensation is intact.      Motor: Motor function is intact.   Psychiatric:         Mood and Affect: Mood normal.         Behavior: Behavior normal.       Results for orders placed or performed in visit on 01/22/24   Rapid Strep    Collection Time: 01/22/24 10:40 AM   Result Value Ref Range    Strep Grp A Screen neg Negative    Control Line Present with a clear background (yes/no) yes Yes/No    Kit Lot # [id number] Numeric    Kit Expiration Date [id number] Date   COVID19 BinaxNOW Antigen    Collection Time: 01/22/24 10:47 AM   Result Value Ref Range    COVID19 Binax Now Antigen Not Detected Not Detected    POCT Lot Number [id number]     POCT Expiration Date 9,[id number]     POCT Procedure Control Control Valid Control Valid     ID MS    Grp A Strep Cult, Throat [E]    Collection Time: 01/22/24  6:40 PM    Specimen: Throat; Other   Result Value Ref Range    Strep Culture No Beta Hemolytic Strep Isolated    SARS-CoV-2/Flu A and B/RSV by PCR (Alinity) [E]    Collection Time: 01/22/24  6:40 PM    Specimen: Nares; Other   Result Value Ref Range    SARS-CoV-2 (COVID-19)- (Alinity) Not Detected Not Detected    Influenza A by PCR Not Detected Not Detected    Influenza B by PCR Not Detected Not Detected    RSV by PCR Not Detected Not Detected       ASSESSMENT  AND PLAN:     1. Acute non-recurrent sinusitis, unspecified location  - amoxicillin clavulanate 875-125 MG Oral Tab; Take 1 tablet by mouth 2 (two) times daily for 10 days.  Dispense: 20 tablet; Refill: 0    2. Sore throat  - COVID19 BinaxNOW Antigen  - Rapid Strep    3. Nasal congestion  - COVID19 BinaxNOW Antigen  - Rapid Strep    4. Cough, unspecified type  - COVID19 BinaxNOW Antigen  - XR CHEST PA + LAT CHEST (CPT=71046); Future    5. Chest tightness  - XR CHEST PA + LAT CHEST (CPT=71046); Future    6. Acute thoracic back pain, unspecified back pain laterality  - XR CHEST PA + LAT CHEST (CPT=71046); Future    Rapid Covid and Strep completed in office-- negative.   Start Augmentin for sinusitis.   May continue symptomatic treatment as needed unless otherwise contraindicated including but not limited to:   Mucinex DM or Robitussin DM as needed  Tylenol or Motrin as needed for fevers or aches  Nasal steroid such as Flonase or Nasacort.  Continue to maintain adequate hydration.  Probiotics or yogurt for gut health.  Take antibiotics with food.   Albuterol inhaler every 4-6 hours as needed.  Will check chest xray given symptoms of pain radiating into back to r/o pneumonia.   Further recommendations on treatment pending chest xray results.       Return if symptoms worsen or fail to improve.    Meds & Refills for this Visit:  Requested Prescriptions     Signed Prescriptions Disp Refills    albuterol 108 (90 Base) MCG/ACT Inhalation Aero Soln 1 each 0     Sig: Inhale 2 puffs into the lungs every 4 to 6 hours as needed for Wheezing or Shortness of Breath.    amoxicillin clavulanate 875-125 MG Oral Tab 20 tablet 0     Sig: Take 1 tablet by mouth 2 (two) times daily for 10 days.       The patient indicates understanding of these issues and agrees to the plan.     Health Maintenance:  Health Maintenance Due   Topic Date Due    DTaP,Tdap,and Td Vaccines (1 - Tdap) Never done    Annual Physical  04/29/2023    COVID-19 Vaccine  (4 - 2024-25 season) 09/01/2024    Influenza Vaccine (1) 10/01/2024    Annual Depression Screening  Never done         Suzan Mckinney, AKIKO    Please note that portions of this note may have been completed with a voice recognition program. Efforts were made to edit the dictations but occasionally words are mis-transcribed.    The 21st Century Cures Act makes medical notes like these available to patients in the interest of transparency. Please be advised this is a medical document. Medical documents are intended to carry relevant information, facts as evident, and the clinical opinion of the practitioner. The medical note is intended as peer to peer communication and may appear blunt or direct. It is written in medical language and may contain abbreviations or verbiage that are unfamiliar.         [1]   Allergies  Allergen Reactions    Cherry Coughing

## 2025-01-20 NOTE — PATIENT INSTRUCTIONS
Complete chest xray    May continue symptomatic treatment as needed unless otherwise contraindicated including but not limited to:   Mucinex DM or Robitussin DM as needed  Tylenol or Motrin as needed for fevers or aches  Nasal steroid such as Flonase or Nasacort.  Continue to maintain adequate hydration.  Probiotics or yogurt for gut health.  Take antibiotics with food.

## 2025-01-20 NOTE — TELEPHONE ENCOUNTER
I called the patient. He has an appointment with you this morning at 10:30. He has had a sore throat since Friday. Started coughing yesterday. No fever.  Partner had bronchitis last week. He has not tested for COVID. He has no tests at home. Do you want the patient to test prior to seeing you?

## 2025-01-20 NOTE — TELEPHONE ENCOUNTER
1. What are your symptoms?    Congested , lungs hurt when sneezing or coughing, back pain    2. How long have you been having these symptoms?    Symptoms have been since 1/17/25    3. Have you done anything already to treat your symptoms?     Patient has used over the counter Dayquil and Nyquil    ADDITIONAL INFO:  Appointment today at 10:30 with Suzan. Please Advise

## 2025-01-29 ENCOUNTER — LAB ENCOUNTER (OUTPATIENT)
Dept: LAB | Age: 25
End: 2025-01-29
Payer: COMMERCIAL

## 2025-01-29 ENCOUNTER — TELEPHONE (OUTPATIENT)
Dept: FAMILY MEDICINE CLINIC | Facility: CLINIC | Age: 25
End: 2025-01-29

## 2025-01-29 ENCOUNTER — OFFICE VISIT (OUTPATIENT)
Dept: FAMILY MEDICINE CLINIC | Facility: CLINIC | Age: 25
End: 2025-01-29
Payer: COMMERCIAL

## 2025-01-29 ENCOUNTER — HOSPITAL ENCOUNTER (OUTPATIENT)
Dept: CT IMAGING | Age: 25
Discharge: HOME | End: 2025-01-29
Payer: COMMERCIAL

## 2025-01-29 VITALS
WEIGHT: 232 LBS | DIASTOLIC BLOOD PRESSURE: 72 MMHG | TEMPERATURE: 98 F | SYSTOLIC BLOOD PRESSURE: 110 MMHG | BODY MASS INDEX: 33.21 KG/M2 | HEART RATE: 92 BPM | RESPIRATION RATE: 16 BRPM | HEIGHT: 70 IN

## 2025-01-29 DIAGNOSIS — R31.29 OTHER MICROSCOPIC HEMATURIA: ICD-10-CM

## 2025-01-29 DIAGNOSIS — M54.9 MID BACK PAIN ON RIGHT SIDE: ICD-10-CM

## 2025-01-29 DIAGNOSIS — R10.11 RUQ PAIN: ICD-10-CM

## 2025-01-29 DIAGNOSIS — M54.9 MID BACK PAIN ON RIGHT SIDE: Primary | ICD-10-CM

## 2025-01-29 LAB
ALBUMIN SERPL-MCNC: 4.5 G/DL (ref 3.2–4.8)
ALBUMIN/GLOB SERPL: 1.4 {RATIO} (ref 1–2)
ALP LIVER SERPL-CCNC: 55 U/L
ALT SERPL-CCNC: 91 U/L
ANION GAP SERPL CALC-SCNC: 5 MMOL/L (ref 0–18)
APPEARANCE: CLEAR
AST SERPL-CCNC: 40 U/L (ref ?–34)
BASOPHILS # BLD AUTO: 0.05 X10(3) UL (ref 0–0.2)
BASOPHILS NFR BLD AUTO: 0.7 %
BILIRUB SERPL-MCNC: 0.3 MG/DL (ref 0.3–1.2)
BILIRUBIN: NEGATIVE
BUN BLD-MCNC: 16 MG/DL (ref 9–23)
CALCIUM BLD-MCNC: 9.3 MG/DL (ref 8.7–10.6)
CHLORIDE SERPL-SCNC: 108 MMOL/L (ref 98–112)
CO2 SERPL-SCNC: 25 MMOL/L (ref 21–32)
CREAT BLD-MCNC: 0.9 MG/DL
EGFRCR SERPLBLD CKD-EPI 2021: 121 ML/MIN/1.73M2 (ref 60–?)
EOSINOPHIL # BLD AUTO: 0.19 X10(3) UL (ref 0–0.7)
EOSINOPHIL NFR BLD AUTO: 2.5 %
ERYTHROCYTE [DISTWIDTH] IN BLOOD BY AUTOMATED COUNT: 12.6 %
FASTING STATUS PATIENT QL REPORTED: NO
GLOBULIN PLAS-MCNC: 3.2 G/DL (ref 2–3.5)
GLUCOSE (URINE DIPSTICK): NEGATIVE MG/DL
GLUCOSE BLD-MCNC: 86 MG/DL (ref 70–99)
HCT VFR BLD AUTO: 45.5 %
HGB BLD-MCNC: 15.2 G/DL
IMM GRANULOCYTES # BLD AUTO: 0.01 X10(3) UL (ref 0–1)
IMM GRANULOCYTES NFR BLD: 0.1 %
KETONES (URINE DIPSTICK): NEGATIVE MG/DL
LEUKOCYTES: NEGATIVE
LIPASE SERPL-CCNC: 34 U/L (ref 12–53)
LYMPHOCYTES # BLD AUTO: 3.27 X10(3) UL (ref 1–4)
LYMPHOCYTES NFR BLD AUTO: 43.8 %
MCH RBC QN AUTO: 30.6 PG (ref 26–34)
MCHC RBC AUTO-ENTMCNC: 33.4 G/DL (ref 31–37)
MCV RBC AUTO: 91.5 FL
MONOCYTES # BLD AUTO: 0.81 X10(3) UL (ref 0.1–1)
MONOCYTES NFR BLD AUTO: 10.9 %
MULTISTIX LOT#: ABNORMAL NUMERIC
NEUTROPHILS # BLD AUTO: 3.13 X10 (3) UL (ref 1.5–7.7)
NEUTROPHILS # BLD AUTO: 3.13 X10(3) UL (ref 1.5–7.7)
NEUTROPHILS NFR BLD AUTO: 42 %
NITRITE, URINE: NEGATIVE
OSMOLALITY SERPL CALC.SUM OF ELEC: 286 MOSM/KG (ref 275–295)
PH, URINE: 6 (ref 4.5–8)
PLATELET # BLD AUTO: 389 10(3)UL (ref 150–450)
POTASSIUM SERPL-SCNC: 3.8 MMOL/L (ref 3.5–5.1)
PROT SERPL-MCNC: 7.7 G/DL (ref 5.7–8.2)
PROTEIN (URINE DIPSTICK): NEGATIVE MG/DL
RBC # BLD AUTO: 4.97 X10(6)UL
SODIUM SERPL-SCNC: 138 MMOL/L (ref 136–145)
SPECIFIC GRAVITY: 1.02 (ref 1–1.03)
URINE-COLOR: YELLOW
UROBILINOGEN,SEMI-QN: 0.2 MG/DL (ref 0–1.9)
WBC # BLD AUTO: 7.5 X10(3) UL (ref 4–11)

## 2025-01-29 PROCEDURE — 80053 COMPREHEN METABOLIC PANEL: CPT

## 2025-01-29 PROCEDURE — 3074F SYST BP LT 130 MM HG: CPT

## 2025-01-29 PROCEDURE — 81003 URINALYSIS AUTO W/O SCOPE: CPT

## 2025-01-29 PROCEDURE — 85025 COMPLETE CBC W/AUTO DIFF WBC: CPT

## 2025-01-29 PROCEDURE — 99214 OFFICE O/P EST MOD 30 MIN: CPT

## 2025-01-29 PROCEDURE — 83690 ASSAY OF LIPASE: CPT

## 2025-01-29 PROCEDURE — 87086 URINE CULTURE/COLONY COUNT: CPT

## 2025-01-29 PROCEDURE — 3078F DIAST BP <80 MM HG: CPT

## 2025-01-29 PROCEDURE — 74176 CT ABD & PELVIS W/O CONTRAST: CPT

## 2025-01-29 PROCEDURE — 3008F BODY MASS INDEX DOCD: CPT

## 2025-01-29 NOTE — TELEPHONE ENCOUNTER
Caprice from radiology called and states patient's STAT CT Abdomen/Pelvis is completed and ready review.

## 2025-01-29 NOTE — PROGRESS NOTES
[facility] Family Medicine Office Note  Chief Complaint:   Chief Complaint   Patient presents with    Back Pain       HPI:   This is a 24 year old male coming in for right sided mid-back pain which began on Wednesday. Denies any injury or trauma. Pain is dull, constant, and 5-6/10 in intensity. At time pain radiates into the right upper abdomen. Patient states there is typically no change in his pain with changes in positioning. Denies any cough, shortness of breath, chest pain, fevers, or chills. Denies history of kidney stone. No change in bowel or bladder function.     Patient voices concerns about family history of cancer: Dad has kidney cancer, paternal uncle has colon cancer, maternal aunt has ovarian cancer    Patient was recently treated for sinusitis and URI    Patient Active Problem List   Diagnosis    Class 1 obesity due to excess calories without serious comorbidity with body mass index (BMI) of 30.0 to 30.9 in adult    Episodes of staring     History reviewed. No pertinent past medical history.  History reviewed. No pertinent surgical history.  Social History:  Social History     Socioeconomic History    Marital status: Single   Tobacco Use    Smoking status: Never    Smokeless tobacco: Never   Vaping Use    Vaping status: Never Used   Substance and Sexual Activity    Alcohol use: Yes     Comment: varies1 a year    Drug use: Never   Other Topics Concern    Caffeine Concern Yes    Exercise Yes    Seat Belt Yes     Family History:  History reviewed. No pertinent family history.  Allergies:  Allergies[1]  Current Meds:  Current Outpatient Medications   Medication Sig Dispense Refill    albuterol 108 (90 Base) MCG/ACT Inhalation Aero Soln Inhale 2 puffs into the lungs every 4 to 6 hours as needed for Wheezing or Shortness of Breath. 1 each 0    amoxicillin clavulanate 875-125 MG Oral Tab Take 1 tablet by mouth 2 (two) times daily for 10 days. 20 tablet 0      Counseling given: Not Answered        REVIEW OF SYSTEMS:   Review of Systems   Constitutional: Negative.    HENT: Negative.     Eyes: Negative.    Respiratory: Negative.     Cardiovascular: Negative.    Gastrointestinal: Negative.    Endocrine: Negative.    Genitourinary: Negative.    Musculoskeletal:  Positive for back pain.   Skin: Negative.    Neurological: Negative.    Hematological: Negative.    Psychiatric/Behavioral: Negative.           EXAM:   /72   Pulse 92   Temp 98.3 °F (36.8 °C) (Temporal)   Resp 16   Ht 5' 10\" (1.778 m)   Wt 232 lb (105.2 kg)   BMI 33.29 kg/m²  Estimated body mass index is 33.29 kg/m² as calculated from the following:    Height as of this encounter: 5' 10\" (1.778 m).    Weight as of this encounter: 232 lb (105.2 kg).   Vital signs reviewed.Appears stated age, well groomed.  Physical Exam  Constitutional:       Appearance: Normal appearance.   HENT:      Head: Normocephalic and atraumatic.      Nose: Nose normal.   Eyes:      Extraocular Movements: Extraocular movements intact.      Conjunctiva/sclera: Conjunctivae normal.      Pupils: Pupils are equal, round, and reactive to light.   Cardiovascular:      Rate and Rhythm: Normal rate and regular rhythm.      Pulses: Normal pulses.      Heart sounds: Normal heart sounds. No murmur heard.     No friction rub. No gallop.   Pulmonary:      Effort: Pulmonary effort is normal.      Breath sounds: Normal breath sounds. No wheezing, rhonchi or rales.   Chest:      Comments: +tenderness with palpation of lateral right chest wall   Abdominal:      General: Abdomen is protuberant.      Palpations: Abdomen is soft. There is no hepatomegaly, splenomegaly or mass.      Tenderness: There is abdominal tenderness in the right upper quadrant. There is no right CVA tenderness, left CVA tenderness or rebound.   Skin:     General: Skin is warm and dry.      Capillary Refill: Capillary refill takes less than 2 seconds.   Neurological:      General: No focal deficit present.       Mental Status: He is alert and oriented to person, place, and time. Mental status is at baseline.      Cranial Nerves: Cranial nerves 2-12 are intact.      Sensory: Sensation is intact.      Motor: Motor function is intact.   Psychiatric:         Mood and Affect: Mood normal.         Behavior: Behavior normal.       Results for orders placed or performed in visit on 01/29/25   Urine Dip, auto without Micro    Collection Time: 01/29/25  1:39 PM   Result Value Ref Range    Glucose Urine Negative Negative mg/dL    Bilirubin Urine Negative Negative    Ketones, UA Negative Negative - Trace mg/dL    Spec Gravity 1.025 1.005 - 1.030    Blood Urine Moderate (A) Negative    PH Urine 6.0 5.0 - 8.0    Protein Urine Negative Negative - Trace mg/dL    Urobilinogen Urine 0.2 0.2 - 1.0 mg/dL    Nitrite Urine Negative Negative    Leukocyte Esterase Urine Negative Negative    APPEARANCE CLEAR Clear    Color Urine YELLOW Yellow    Multistix Lot# [id number] Numeric    Multistix Expiration Date 06/30/2025 Date           ASSESSMENT AND PLAN:     1. Mid back pain on right side  - Urine Dip, auto without Micro  - CBC W Differential W Platelet [E]; Future  - Comp Metabolic Panel (14) [E]; Future  - Lipase [E]; Future  - CT ABDOMEN+PELVIS KIDNEYSTONE 2D RNDR(NO IV,NO ORAL)(CPT=74176); Future  - Urine Culture, Routine [E]; Future  - Urine Culture, Routine [E]    2. Other microscopic hematuria  - CT ABDOMEN+PELVIS KIDNEYSTONE 2D RNDR(NO IV,NO ORAL)(CPT=74176); Future    3. RUQ pain  - Urine Dip, auto without Micro  - CBC W Differential W Platelet [E]; Future  - Comp Metabolic Panel (14) [E]; Future  - Lipase [E]; Future  - CT ABDOMEN+PELVIS KIDNEYSTONE 2D RNDR(NO IV,NO ORAL)(CPT=74176); Future  - Urine Culture, Routine [E]; Future  - Urine Culture, Routine [E]      Moderate blood noted on urine dip. In the presence of mid-back pain, will obtain CT abdomen + pelvis to r/o kidney stone.   RUQ tenderness with palpation present on examination    Complete blood work   Further recommendations pending test results.       Meds & Refills for this Visit:  Requested Prescriptions      No prescriptions requested or ordered in this encounter       The patient indicates understanding of these issues and agrees to the plan.     Health Maintenance:  Health Maintenance Due   Topic Date Due    DTaP,Tdap,and Td Vaccines (1 - Tdap) Never done    Annual Physical  04/29/2023    COVID-19 Vaccine (4 - 2024-25 season) 09/01/2024    Influenza Vaccine (1) 10/01/2024    Annual Depression Screening  Never done         Suzan Mckinney, APRN    Please note that portions of this note may have been completed with a voice recognition program. Efforts were made to edit the dictations but occasionally words are mis-transcribed.    The 21st Century Cures Act makes medical notes like these available to patients in the interest of transparency. Please be advised this is a medical document. Medical documents are intended to carry relevant information, facts as evident, and the clinical opinion of the practitioner. The medical note is intended as peer to peer communication and may appear blunt or direct. It is written in medical language and may contain abbreviations or verbiage that are unfamiliar.         [1]   Allergies  Allergen Reactions    Cherry Coughing

## 2025-01-30 ENCOUNTER — PATIENT MESSAGE (OUTPATIENT)
Dept: FAMILY MEDICINE CLINIC | Facility: CLINIC | Age: 25
End: 2025-01-30

## 2025-01-30 ENCOUNTER — HOSPITAL ENCOUNTER (OUTPATIENT)
Dept: ULTRASOUND IMAGING | Age: 25
Discharge: HOME | End: 2025-01-30
Payer: COMMERCIAL

## 2025-01-30 DIAGNOSIS — M54.9 MID BACK PAIN ON RIGHT SIDE: ICD-10-CM

## 2025-01-30 DIAGNOSIS — R31.29 OTHER MICROSCOPIC HEMATURIA: ICD-10-CM

## 2025-01-30 DIAGNOSIS — R10.11 RUQ PAIN: ICD-10-CM

## 2025-01-30 PROCEDURE — 76700 US EXAM ABDOM COMPLETE: CPT

## 2025-01-31 ENCOUNTER — PATIENT MESSAGE (OUTPATIENT)
Dept: FAMILY MEDICINE CLINIC | Facility: CLINIC | Age: 25
End: 2025-01-31

## 2025-01-31 DIAGNOSIS — N28.1 ACQUIRED RENAL CYST OF RIGHT KIDNEY: Primary | ICD-10-CM

## 2025-01-31 RX ORDER — NAPROXEN 500 MG/1
500 TABLET ORAL 2 TIMES DAILY WITH MEALS
Qty: 20 TABLET | Refills: 0 | Status: SHIPPED | OUTPATIENT
Start: 2025-01-31

## 2025-01-31 NOTE — TELEPHONE ENCOUNTER
Please review patients message. I did advise him to make sure he is on Dr. House's cancellation list.

## 2025-01-31 NOTE — TELEPHONE ENCOUNTER
The imaging report says to follow up with further imaging. Let's do a CT with renal protocol to evaluate further, do not cancel current urology appointment, ask to be on wait list until we get more information. Thank you.

## 2025-01-31 NOTE — TELEPHONE ENCOUNTER
I called the patient and informed him Dr. Soni would like him to get a CT scan of the abdomen and pelvis. Keep appointment with urology. We will follow up as soon as she reviews the results of the CT. Pt. Agreed to plan and verbalized understanding

## 2025-02-03 ENCOUNTER — TELEPHONE (OUTPATIENT)
Dept: FAMILY MEDICINE CLINIC | Facility: CLINIC | Age: 25
End: 2025-02-03

## 2025-02-03 DIAGNOSIS — N28.1 ACQUIRED RENAL CYST OF RIGHT KIDNEY: Primary | ICD-10-CM

## 2025-02-03 NOTE — TELEPHONE ENCOUNTER
Thank you for the clarification. Will order CT abdomen/pelvis with and without contrast. Okay to cancel other order. Thank you.

## 2025-02-03 NOTE — TELEPHONE ENCOUNTER
Patricia from [facility] called regarding CT abdomen and pelvis order (contrast only) placed 1/31.   States that the comment shows renal protocol which also includes non-contrast CT    She is recommending to change the order either abdomen with or without pelvis with contrast or CT abdomen pelvis with and without contrast.   States that these will both show abnormality in both areas.     Routed to Dr. Soni. Thank you.

## 2025-02-05 ENCOUNTER — TELEPHONE (OUTPATIENT)
Dept: FAMILY MEDICINE CLINIC | Facility: CLINIC | Age: 25
End: 2025-02-05

## 2025-02-05 NOTE — TELEPHONE ENCOUNTER
Pt is scheduled to have CT Scan tomorrow, 2/6/25.    Pt states there were two CT orders placed, one w/ contrast and one w/o. Pt asking for clarification on which CT he should have completed or if he should have both?

## 2025-02-05 NOTE — TELEPHONE ENCOUNTER
Patient informed of update in order to CT abdomen/pelvis with and without contrast.   Spoke to Lourdes from [facility], informed of update in order as well.

## 2025-02-06 ENCOUNTER — TELEPHONE (OUTPATIENT)
Dept: CASE MANAGEMENT | Age: 25
End: 2025-02-06

## 2025-02-06 DIAGNOSIS — N28.1 RENAL CYST: ICD-10-CM

## 2025-02-06 DIAGNOSIS — M54.9 MID BACK PAIN ON RIGHT SIDE: ICD-10-CM

## 2025-02-06 DIAGNOSIS — N28.1 ACQUIRED RENAL CYST OF RIGHT KIDNEY: ICD-10-CM

## 2025-02-06 DIAGNOSIS — R31.29 MICROSCOPIC HEMATURIA: Primary | ICD-10-CM

## 2025-02-06 NOTE — TELEPHONE ENCOUNTER
Called patient and informed of new order and he voiced understanding and agreed with plan.    Called central scheduling and spoke with Silvina to change order on the scheduled date and time to alert the referral department to reprocess for authorization and Silvina voiced understanding and replaced the old order (CT ABD+Pelvis) to the new one (CT ABD only) and resent the request to referral.  Order is currently in pending review.    Per patient he wanted to know when it is authorized so he can call central scheduling back to reschedule for a sooner appointment.  Advised him to call next week to confirm.  He voiced understanding and agreed.

## 2025-02-06 NOTE — TELEPHONE ENCOUNTER
Patient called stating he the CT was denied by [employer]. Patient called [employer] and [employer] states the approval is pending it can take 7-14 business days for the approval. [employer] wants to know if the order can be expedited. Please Advise

## 2025-02-06 NOTE — TELEPHONE ENCOUNTER
Patient had an ultrasound already. Let's get a CT of the abdomen instead to evaluate the cyst further. Thank you.

## 2025-02-06 NOTE — TELEPHONE ENCOUNTER
Hello     We have received correspondence from the health [facility] regarding the request Ct abdomen and pelvis. This request has been denied by the health [facility].     Clinical rationale:    Your doctor is checking you for a cyst on your kidney. A cyst is a closed sac growing in the kidney that is not normal. Your doctor ordered a test that takes pictures of the inside of your abdomen and pelvis (CT scan of your abdomen and pelvis). It is not medically necessary to use this test to check the kidneys. There are other tests, such as an ultrasound or a CT scan of the abdomen only (without pelvis), that might help in a situation like yours. We suggest that you continue to see your doctor, to see what kind of tests might help you. We used [employer] Clinical Guideline titled Imaging of the Abdomen and Pelvis to make this decision. You may view this guideline at [email or web address]/mbm-guidelines-radiology.    Your doctor is checking you for a cyst on your kidney. A cyst is a closed sac growing in the kidney that is not normal. Your doctor ordered a test that takes pictures of the inside of your abdomen and pelvis (CT scan of your abdomen and pelvis). It is not medically necessary to use this test to check the kidneys. There are other tests, such as an ultrasound or a CT scan of the abdomen only (without pelvis), that might help in a situation like yours. We suggest that you continue to see your doctor, to see what kind of tests might help you. We used [employer] Clinical Guideline titled Imaging of the Abdomen and Pelvis to make this decision. You may view this guideline at [email or web address]/mbm-guidelines-radiology.    You are welcome to complete a peer to peer   Ph. [phone]  Case # [id number]    Patient has cancelled appointment. Please advise plan of care.     Thank you,  [place]  Referral specialist

## 2025-02-10 NOTE — TELEPHONE ENCOUNTER
Hello     The CT was denied and the details were sent to the office.     Thank you,   [place]  Referral specialist

## 2025-02-11 ENCOUNTER — TELEPHONE (OUTPATIENT)
Dept: CASE MANAGEMENT | Age: 25
End: 2025-02-11

## 2025-02-11 ENCOUNTER — PATIENT MESSAGE (OUTPATIENT)
Dept: CASE MANAGEMENT | Age: 25
End: 2025-02-11

## 2025-02-11 NOTE — TELEPHONE ENCOUNTER
Hello     We attempted to reach patient regarding the request for CT abdomen. This request is pending the health plan. We encourage patient to reschedule.     I have sent a [employer] message.     Please advise      Thank you,  [place]  Referral specialist

## 2025-02-11 NOTE — TELEPHONE ENCOUNTER
Contacted patient regarding the message below.     Informed patient that the request for the CT abdomen is still pending the health plan, and it is recommended that the patient reschedules.     Patient verbalized an understanding. He states he will call to reschedule the CT abdomen.

## 2025-02-12 ENCOUNTER — OFFICE VISIT (OUTPATIENT)
Dept: SURGERY | Facility: CLINIC | Age: 25
End: 2025-02-12
Payer: COMMERCIAL

## 2025-02-12 VITALS
RESPIRATION RATE: 20 BRPM | TEMPERATURE: 98 F | SYSTOLIC BLOOD PRESSURE: 122 MMHG | OXYGEN SATURATION: 99 % | DIASTOLIC BLOOD PRESSURE: 78 MMHG | HEART RATE: 97 BPM

## 2025-02-12 DIAGNOSIS — K80.50 RECURRENT BILIARY COLIC: Primary | ICD-10-CM

## 2025-02-12 PROCEDURE — 99204 OFFICE O/P NEW MOD 45 MIN: CPT | Performed by: SURGERY

## 2025-02-12 PROCEDURE — 3078F DIAST BP <80 MM HG: CPT | Performed by: SURGERY

## 2025-02-12 PROCEDURE — 3074F SYST BP LT 130 MM HG: CPT | Performed by: SURGERY

## 2025-02-12 NOTE — H&P
New Patient Visit Note       Active Problems      1. Recurrent biliary colic        Chief Complaint   Chief Complaint   Patient presents with    New Patient     NP- Gallstones Consult-, Gallbladder, US Abd on 1/30/25- reports pain on right lower back that comes and goes, denies n/v, fevers or chills         History of Present Illness     The patient presents at the request of his primary care physician for evaluation of gallstones and pain.  January 30, 2025 the patient experienced discomfort in his right upper back that radiated to the right upper quadrant prompting evaluation emergency room.  Ultrasound performed reveals gallstones and sludge without evidence of cholecystitis.  I reviewed the images I concur with the interpretation.  Since that time the patient states he gets occasional intermittent episodes of pain either in the upper abdomen or in the right upper back but he is uncertain if there is any specific provocative factors such as food intake.  No specific palliative factors identified.  The patient states the episodes of pain vary in intensity and duration but resolve spontaneously.  No other complaints offered.    The patient denies fever, chills, chest pain, shortness of breath, dyspnea. The patient also denies hematemesis, melena, or hematochezia. The patient denies change in bowel or bladder habits. There is no complaint of hematuria or dysuria.    I discussed the risk, benefits, alternatives of surgery.  I discussed the anticipated postoperative recovery including dietary, activity, exercise, and lifestyle recommendations.  The patient's questions regarding surgical procedure were answered and the patient voiced understanding of the care plan.    Allergies  Nolan is allergic to gonzalez.    Past Medical / Surgical / Social / Family History    The past medical and past surgical history have been reviewed by me today.    History reviewed. No pertinent past medical history.  History reviewed. No  pertinent surgical history.    The family history and social history have been reviewed by me today.    History reviewed. No pertinent family history.  Social History     Socioeconomic History    Marital status: Single   Tobacco Use    Smoking status: Never    Smokeless tobacco: Never   Vaping Use    Vaping status: Never Used   Substance and Sexual Activity    Alcohol use: Yes     Comment: varies1 a year    Drug use: Never   Other Topics Concern    Caffeine Concern Yes    Exercise Yes    Seat Belt Yes        Current Outpatient Medications:     albuterol 108 (90 Base) MCG/ACT Inhalation Aero Soln, Inhale 2 puffs into the lungs every 4 to 6 hours as needed for Wheezing or Shortness of Breath., Disp: 1 each, Rfl: 0    naproxen 500 MG Oral Tab, Take 1 tablet (500 mg total) by mouth 2 (two) times daily with meals., Disp: 20 tablet, Rfl: 0      Review of Systems  The Review of Systems has been reviewed by me during today.  Review of Systems   Constitutional:  Negative for chills, diaphoresis, fatigue, fever and unexpected weight change.   HENT:  Negative for hearing loss, nosebleeds, sore throat and trouble swallowing.    Respiratory:  Negative for apnea, cough, shortness of breath and wheezing.    Cardiovascular:  Negative for chest pain, palpitations and leg swelling.   Gastrointestinal:  Negative for abdominal distention, abdominal pain, anal bleeding, blood in stool, constipation, diarrhea, nausea and vomiting.   Genitourinary:  Negative for difficulty urinating, dysuria, frequency and urgency.   Musculoskeletal:  Negative for arthralgias and myalgias.   Skin:  Negative for color change and rash.   Neurological:  Negative for tremors, syncope and weakness.   Hematological:  Negative for adenopathy. Does not bruise/bleed easily.   Psychiatric/Behavioral:  Negative for behavioral problems and sleep disturbance.        Physical Findings   /78   Pulse 97   Temp 98.3 °F (36.8 °C) (Temporal)   Resp 20   SpO2 99%    Physical Exam  Vitals and nursing note reviewed.   Constitutional:       General: He is not in acute distress.     Appearance: He is well-developed.   HENT:      Head: Normocephalic and atraumatic.   Eyes:      General: No scleral icterus.     Pupils: Pupils are equal, round, and reactive to light.   Neck:      Vascular: No JVD.      Trachea: No tracheal deviation.   Cardiovascular:      Rate and Rhythm: Normal rate and regular rhythm.   Pulmonary:      Effort: Pulmonary effort is normal. No respiratory distress.      Breath sounds: No stridor.   Abdominal:      General: Bowel sounds are normal. There is no distension.      Palpations: Abdomen is soft. Abdomen is not rigid. There is no mass.      Tenderness: There is abdominal tenderness in the right upper quadrant. There is no guarding or rebound. Negative signs include Padilla's sign and McBurney's sign.      Hernia: No hernia is present.       Musculoskeletal:         General: Normal range of motion.      Cervical back: Normal range of motion and neck supple.   Skin:     General: Skin is warm and dry.   Neurological:      Mental Status: He is alert and oriented to person, place, and time.   Psychiatric:         Behavior: Behavior normal.             Assessment   1. Recurrent biliary colic          Plan     The patient may benefit from laparoscopic cholecystectomy.    The patient is not yet ready to commit to surgical intervention.    The patient has CT scan of the abdomen pelvis scheduled next week to evaluate cystic lesion seen on his kidney on ultrasound.  Patient is concerned because his father has kidney cancer and he wishes to focus on this evaluation prior to finalizing decision for surgery.    The patient wishes to proceed with surgery, he will be scheduled for laparoscopic cholecystectomy.  The patient will contact my office when he is ready to finalize surgical planning.    The evens-operative care plan was discussed with the patient, who voices  understanding.  Activity and lifting recommendations were discussed in length.     The risks, benefits, and alternatives to the procedure were explained to the patient.  The risks explained include, but are not limited to, bleeding, infection, pain wound complications, recurrence, incorrect diagnosis, injury to adjacent organs and structures. We also discussed the possibile need for further therapeutic, diagnostic, or surgical intervention.  The patient voiced understanding, and after all questions were answered to the patient's satisfaction, the patient provided willing and informed consent to proceed.     No orders of the defined types were placed in this encounter.      Imaging & Referrals   None    Follow Up  No follow-ups on file.    Marito Mcguire MD    Date of Surgery:     DX:     ICD-10-CM   1. Recurrent biliary colic  K80.50        Surgery Site :    [] Open  [x] Laparoscopic  [] Robotic    [] Pilonidal Cystectomy  [] Excision of Lipomas     [] Sigmoidectomies    [] Hemorrhoidectomy   [] Transanal Hemorrhoidal Dearterialization [] Rectal Exam Under Anesthesia  [] Ureteral stent, Urologist  [] Hernia   [] Ventral  [] Umbilical  [] Inguinal  [] Incisional  [] Paraesophageal [] Component Separation (TAR)   [x] Cholecystectomy with ICG  [] Appendectomy    [] Port placement      Anesthesia: ANESTHESIA TYPE: Gen    Location:  [] [facility]   [] [facility]  [] [facility] OR  [x] [facility] OR   [] [facility]    Admission Status: EDW OR ADMIT LOCATION: [facility] Needed: Yes/No: Yes: PA       Clearance Needed:  []Medical Clearance   []Cardiac Clearance:   []Anticoagulation Management:    []Renal:   []Neuro:     Hx sleep apnea:          Pacemaker:        Latex allergies:      Pre-Admission Testing:  Surgeon's Personalized order Set.   Prophylactic Antibiotics Order: Prophylactic antibiotic protocol    Equipment:   [] C-Arm  Other:    Marito Mcguire MD FACS  Trauma Medical Director, [facility]  EMG  General Surgery    The 21st Century Cures Act makes medical notes like these available to patients in the interest of transparency. Please be advised this is a medical document. Medical documents are intended to carry relevant information, facts as evident, and the clinical opinion of the practitioner. The medical note is intended as peer to peer communication and may appear blunt or direct. It is written in medical language and may contain abbreviations or verbiage that are unfamiliar.    This note was prepared using Dragon Medical voice recognition dictation software. As a result, errors may occur. When identified, these errors have been corrected. While every attempt is made to correct errors during dictation, discrepancies may still exist.

## 2025-02-13 ENCOUNTER — TELEPHONE (OUTPATIENT)
Dept: FAMILY MEDICINE CLINIC | Facility: CLINIC | Age: 25
End: 2025-02-13

## 2025-02-13 ENCOUNTER — TELEPHONE (OUTPATIENT)
Dept: CASE MANAGEMENT | Age: 25
End: 2025-02-13

## 2025-02-13 ENCOUNTER — TELEPHONE (OUTPATIENT)
Facility: LOCATION | Age: 25
End: 2025-02-13

## 2025-02-13 ENCOUNTER — PATIENT MESSAGE (OUTPATIENT)
Dept: CASE MANAGEMENT | Age: 25
End: 2025-02-13

## 2025-02-13 NOTE — TELEPHONE ENCOUNTER
Patient is still deciding if he would want to have surgery.    Patient will call when ready to schedule.

## 2025-02-13 NOTE — TELEPHONE ENCOUNTER
Called patient and informed of the authorization for his CT of abdomen and he voiced understanding and agreed with plan.

## 2025-02-13 NOTE — TELEPHONE ENCOUNTER
Patient calling in because his CT was denied by his insurance. Patient spoke with his insurance and they told him that his doctor needs to file an appeal.     Patient is requesting that be done.     Patient would also like a call once completed so he knows the process has started again.

## 2025-02-13 NOTE — TELEPHONE ENCOUNTER
Hello     We have received correspondence from the health plan regarding the request for CT abdomen. The health plan has denied this request.     Clinical rationale:     Your doctor told us that you have blood in your urine. Your doctor ordered a CT scan of your abdomen. A CT is a way to take pictures of the inside of your body. We reviewed the notes we have. The notes show that this is a duplicate request. The same or similar test was approved for you within the past 60 days. The notes do not show why you need to repeat this test. Based on the information we have, this second request is not medically necessary. We used [employer] Medical Benefits Management Clinical Guideline titled Imaging of the Abdomen and Pelvis to make this decision. You may view this guideline at [email or web address]-guidelines-[email or web address]    Additional information in media     You are welcome to complete a peer to peer  Ph. [phone]  Case # [id number]    Please advise plan of care. Patient is scheduled for 2.18.2025    Thank you,  [place]  Referral specialist

## 2025-02-13 NOTE — TELEPHONE ENCOUNTER
Please refer to the other TE from 2/13/2025 with an authorization info.   Call bell/Explanation of exam/test/Fall precautions/Seizure precautions/Side rails

## 2025-02-13 NOTE — TELEPHONE ENCOUNTER
Peer to peer complete with Dr. Harvey. The CT abdomen w/wo contrast was AUTHORIZED.  Authorization number is: [id number].  This is valid from 2/10/2025 to 3/11/2025.  I was asked to have the CT Abdomen/pelvis canceled. Please cancel the CT Abdomen/pelvis w/wo contrast.   Please update patient as well.   Thank you.

## 2025-02-14 ENCOUNTER — HOSPITAL ENCOUNTER (OUTPATIENT)
Dept: CT IMAGING | Age: 25
Discharge: HOME | End: 2025-02-14
Attending: STUDENT IN AN ORGANIZED HEALTH CARE EDUCATION/TRAINING PROGRAM
Payer: COMMERCIAL

## 2025-02-14 DIAGNOSIS — R31.29 MICROSCOPIC HEMATURIA: ICD-10-CM

## 2025-02-14 DIAGNOSIS — N28.1 ACQUIRED RENAL CYST OF RIGHT KIDNEY: ICD-10-CM

## 2025-02-14 DIAGNOSIS — N28.1 RENAL CYST: ICD-10-CM

## 2025-02-14 DIAGNOSIS — M54.9 MID BACK PAIN ON RIGHT SIDE: ICD-10-CM

## 2025-02-14 PROCEDURE — 74170 CT ABD WO CNTRST FLWD CNTRST: CPT | Performed by: STUDENT IN AN ORGANIZED HEALTH CARE EDUCATION/TRAINING PROGRAM

## 2025-02-17 ENCOUNTER — TELEPHONE (OUTPATIENT)
Dept: SURGERY | Facility: CLINIC | Age: 25
End: 2025-02-17

## 2025-02-17 NOTE — TELEPHONE ENCOUNTER
Per office of Dr. Soni asking if patient can be seen sooner than scheduled appointment on 3/13 for renal neoplasm. Please call patient thank you.

## 2025-02-18 NOTE — TELEPHONE ENCOUNTER
Please refer to TE on 2/13/25 with CT Abdomen authorization information.     Patient had CT Abdomen completed on 2/14/25.

## 2025-02-24 ENCOUNTER — PATIENT MESSAGE (OUTPATIENT)
Dept: FAMILY MEDICINE CLINIC | Facility: CLINIC | Age: 25
End: 2025-02-24

## 2025-02-24 ENCOUNTER — TELEPHONE (OUTPATIENT)
Facility: LOCATION | Age: 25
End: 2025-02-24

## 2025-02-24 DIAGNOSIS — K80.50 RECURRENT BILIARY COLIC: Primary | ICD-10-CM

## 2025-02-25 ENCOUNTER — MED REC SCAN ONLY (OUTPATIENT)
Dept: FAMILY MEDICINE CLINIC | Facility: CLINIC | Age: 25
End: 2025-02-25

## 2025-02-26 ENCOUNTER — TELEPHONE (OUTPATIENT)
Facility: LOCATION | Age: 25
End: 2025-02-26

## 2025-02-26 DIAGNOSIS — K80.50 RECURRENT BILIARY COLIC: Primary | ICD-10-CM

## 2025-02-27 NOTE — TELEPHONE ENCOUNTER
If no symptoms at present then okay to wait to have gallbladder removed. But if severe pain would recommend ER evaluation. I also recommend reaching out to the surgeon to express concerns as well. Thank you .

## 2025-02-28 ENCOUNTER — MED REC SCAN ONLY (OUTPATIENT)
Dept: FAMILY MEDICINE CLINIC | Facility: CLINIC | Age: 25
End: 2025-02-28

## 2025-03-06 ENCOUNTER — OFFICE VISIT (OUTPATIENT)
Facility: LOCATION | Age: 25
End: 2025-03-06
Payer: COMMERCIAL

## 2025-03-06 DIAGNOSIS — N28.1 KIDNEY CYSTS: ICD-10-CM

## 2025-03-06 DIAGNOSIS — R82.90 URINE FINDING: Primary | ICD-10-CM

## 2025-03-06 DIAGNOSIS — N28.89 RENAL MASS: ICD-10-CM

## 2025-03-06 LAB
APPEARANCE: CLEAR
BILIRUBIN: NEGATIVE
GLUCOSE (URINE DIPSTICK): NEGATIVE MG/DL
KETONES (URINE DIPSTICK): NEGATIVE MG/DL
LEUKOCYTES: NEGATIVE
MULTISTIX LOT#: ABNORMAL NUMERIC
NITRITE, URINE: NEGATIVE
PH, URINE: 6 (ref 4.5–8)
SPECIFIC GRAVITY: 1.03 (ref 1–1.03)
URINE-COLOR: YELLOW
UROBILINOGEN,SEMI-QN: 0.2 MG/DL (ref 0–1.9)

## 2025-03-06 PROCEDURE — 99204 OFFICE O/P NEW MOD 45 MIN: CPT | Performed by: UROLOGY

## 2025-03-06 PROCEDURE — 81015 MICROSCOPIC EXAM OF URINE: CPT | Performed by: UROLOGY

## 2025-03-06 PROCEDURE — 81003 URINALYSIS AUTO W/O SCOPE: CPT | Performed by: UROLOGY

## 2025-03-06 NOTE — PROGRESS NOTES
Urology Clinic Note - New Patient    Referring Provider:  Suzan Mckinney APRN  No address on file     Primary Care Provider:  Quita Soni MD     Chief Complaint:   Renal mass    HPI:     Nolan Martínez is a 24 year old male with no significant previous medical history referred for right-sided renal mass, T1b.    Patient without significant urologic history.  He saw his PCP recently and had microscopic hematuria and right upper quadrant pain.  Imaging was done and showed a large cystic lesion in the lower pole of the right kidney, complex cyst versus mass.  He was also noted to have gallbladder sludge.  A follow-up CT scan with and without contrast was done of his abdomen on 2/14/2025.  This showed a complex enhancing mass from the inferior pole of the right kidney 6 centimeters in size.  This is concerning for renal cell carcinoma.  Of note, patient had a small 8mm lesion in his kidney on an MRI in 2022.   Chest x-ray in January of this year was negative.  Patient has a family history of renal cell carcinoma, father had surgery for this.  Of the above gallbladder findings, patient has recently seen Dr. Mcguire and is planned for a laparoscopic cholecystectomy in May.  Patient does have ongoing mild RUQ pain. Has no other urologic issues; no gross hematuria. No recent weight loss. No systemic symptoms.   No previous abdominal surgeries. No smoking.     UA today with moderate blood.   Egfr 121         PSA:  No results found for: \"PSA\", \"PERCENTPSA\", \"PSAS\", \"PSAULTRA\", \"QPSA\", \"PSATOT\", \"TOTPSADX\", \"TOTPSASCREEN\"   Last Cr was 0.9 done on 1/29/2025.      History:   No past medical history on file.    No past surgical history on file.    No family history on file.    Social History     Socioeconomic History    Marital status: Single   Tobacco Use    Smoking status: Never    Smokeless tobacco: Never   Vaping Use    Vaping status: Never Used   Substance and Sexual Activity    Alcohol use: Yes     Comment:  varies1 a year    Drug use: Never   Other Topics Concern    Caffeine Concern Yes    Exercise Yes    Seat Belt Yes       Medications (Active prior to today's visit):  Current Outpatient Medications   Medication Sig Dispense Refill    naproxen 500 MG Oral Tab Take 1 tablet (500 mg total) by mouth 2 (two) times daily with meals. (Patient not taking: Reported on 3/6/2025) 20 tablet 0    albuterol 108 (90 Base) MCG/ACT Inhalation Aero Soln Inhale 2 puffs into the lungs every 4 to 6 hours as needed for Wheezing or Shortness of Breath. (Patient not taking: Reported on 3/6/2025) 1 each 0       Allergies:  Allergies[1]      Review of Systems:   A comprehensive 10-point review of systems was completed.  Pertinent positives and negatives are noted in the the HPI.    Physical Exam:   CONSTITUTIONAL: Well developed, well nourished, in no acute distress  NEUROLOGIC: Alert and oriented  HEAD: Normocephalic, atraumatic  ENT: Hearing intact   RESPIRATORY: Normal respiratory effort  SKIN: No evident rashes  ABDOMEN: Soft, non-tender, non-distended     CT ABDOMEN (W+WO) (CPT=74170)    Result Date: 2/14/2025  CONCLUSION:  There is a 6.0 x 5.9 x 4.6 cm complex enhancing right renal mass arising off the inferior pole of the right kidney.  This is new when compared to prior imaging from 2022. Findings suspicious for neoplasm.  Renal cell carcinoma should be excluded.  Diffuse fatty infiltration of the liver with focal areas of fatty sparing in the right lobe.   LOCATION:  [facility]   Dictated by (CST): Carmle Valdes MD on 2/14/2025 at 2:56 PM     Finalized by (CST): Carmel Valdes MD on 2/14/2025 at 3:06 PM          Assessment & Plan:     Renal mass   T1b    I discussed with the patient the following options for small renal masses, based on the American Urologic Association guidelines.     1.  Active surveillance - for patients with a solid renal mass less than 2 cm this is the preferred method of management.  Active surveillance protocol  includes cross-sectional imaging in 3 to 6 months to assess for interval growth.  If there is growth of the tumor to greater than 3 cm, stage progression, growth kinetics of greater than 5 mm/year, or change in patient/tumor factors the patient would move from active surveillance to treatment options.     2.  Thermal/Cryo ablation - thermal ablation is an approach for cT1a solid renal masses less than 3 cm. Risks include of tumor persistence, local recurrence/need for additional treatment (~15%), and potential dermal spread.     3.  Radical nephrectomy- radical nephrectomy is typically indicated when there is high tumor complexity and partial nephrectomy be would challenging even in experienced hands.  Additionally the patient should typically have no pre-existing CKD or proteinuria and have a normal contralateral kidney. Complications include bleeding (immediate or delayed), infection, damage to surrounding structures (bowel, ureter, major blood vessels, spleen, stomach and pancreas for a left-sided tumor and liver right-sided tumor), need for additional procedures, or conversion to open surgery. Hospital course is typically 1-2 nights in the hospital.     4.  Partial nephrectomy- partial nephrectomy is favored to help spare healthy nephrons when surgery is opted for. I perform this via a minimally-invasive robotic approach. Complications include bleeding (immediate or delayed), infection, damage to surrounding structures (bowel, ureter, major blood vessels, spleen, stomach and pancreas for a left-sided tumor and liver right-sided tumor), need for additional procedures, inability to remove entire tumor, tumor recurrence, pseudoaneurysm, conversion to open surgery, or need for conversion to radical nephrectomy. Hospital course is typically 1-2 nights in the hospital.     5.  Renal mass biopsy -this is typically performed when the mass is of unclear appearance (hematologic, metastatic, inflammatory or infectious).   It is typically done when biopsy is thought to change patient's management.  Mass should additionally be in a feasible location for biopsy by intervention al radiology.      Given age, possible familial syndrome patient is most interested in partial nephrectomy. Given size/complexity, will refer to uro/onc; discussed case with Dr. Peña who will coordinate with Dr. Mcguire to see if partial Nx and cholecystectomy can be done together.   Expectations of surgery discussed in detail.   CT Chest ordered.   Should obtain imaging for Dr. Peña.   Genetics consult placed      Microhematuria; will likely need cystoscopy; may consider at time of above intervention       Thank you for this consult.    I have personally reviewed all relevant medical records, labs, and imaging.     Pedro Jones MD  Staff Urologist  [facility]  Office: [phone]           [1]   Allergies  Allergen Reactions    Cherry Coughing

## 2025-03-10 ENCOUNTER — TELEPHONE (OUTPATIENT)
Facility: LOCATION | Age: 25
End: 2025-03-10

## 2025-03-10 DIAGNOSIS — K80.50 RECURRENT BILIARY COLIC: Primary | ICD-10-CM

## 2025-03-11 ENCOUNTER — TELEPHONE (OUTPATIENT)
Facility: LOCATION | Age: 25
End: 2025-03-11

## 2025-03-11 ENCOUNTER — PATIENT MESSAGE (OUTPATIENT)
Dept: FAMILY MEDICINE CLINIC | Facility: CLINIC | Age: 25
End: 2025-03-11

## 2025-03-11 ENCOUNTER — TELEPHONE (OUTPATIENT)
Dept: FAMILY MEDICINE CLINIC | Facility: CLINIC | Age: 25
End: 2025-03-11

## 2025-03-11 ENCOUNTER — TELEPHONE (OUTPATIENT)
Dept: CASE MANAGEMENT | Age: 25
End: 2025-03-11

## 2025-03-11 NOTE — TELEPHONE ENCOUNTER
RN phoned Patient to discuss below.  Pt. Notified that P2P is completed and may schedule CT as planned.  Patient agreeable with plan.  This Encounter is now closed.

## 2025-03-11 NOTE — TELEPHONE ENCOUNTER
Surgery: robotic right partial nephrectomy  Surgeon: Dr. Pia Peña  Surgery Date: 03/25/2025  Where: [facility]  Tests Needed: H&P, blood thinner clearance (naproxen 7 days)     Requested pre-op testing pended. If further recommendations please add. Thank you.     Pre-op request placed in PSR phone room for scheduling.

## 2025-03-11 NOTE — TELEPHONE ENCOUNTER
Pt called stating the ct scan of the chest was denied by the insurance.  Need to call claims for a peer to peer.  Please call

## 2025-03-11 NOTE — TELEPHONE ENCOUNTER
Hello     We have received correspondence from the health plan regarding the request for CT chest. The health plan has denied this request.     Clinical rationale:   Your doctor told us that you have chest pain. Your doctor ordered a CT scan of your chest. A CT is a way to take pictures of the inside of your body. This test should be used when your doctor is looking for a specific condition. These conditions might include a collapsed lung or fluid collection around the lungs. This test is also needed when you have had a chest x-ray with abnormal findings that need to be checked with a CT scan. We reviewed the notes we have. The notes do not show that your doctor is looking for one of these conditions. The notes do not show that you had a recent chest x-ray with such abnormalities. Based on the information we have, this test is not medically necessary. We used [facility] Medical Benefits Management Clinical Guideline titled Imaging of the Chest to make this decision. You may view this guideline at [email or web address]/mb-guidelines-radiology.    Additional information in media     You are welcome to complete a peer to peer   Ph. [phone]  Case # [id number]           Patient is scheduled for 3.12.25. Please advise plan of care.     Thank you,  [place]  Referral specialist

## 2025-03-11 NOTE — TELEPHONE ENCOUNTER
VALDEMAR Herrera:  For your review and recommendation   Requesting Peer to Peer Review        Hello     We have received correspondence from the health [facility] regarding the request for CT chest. The health plan has denied this request.     Clinical rationale:   Your doctor told us that you have chest pain. Your doctor ordered a CT scan of your chest. A CT is a way to take pictures of the inside of your body. This test should be used when your doctor is looking for a specific condition. These conditions might include a collapsed lung or fluid collection around the lungs. This test is also needed when you have had a chest x-ray with abnormal findings that need to be checked with a CT scan. We reviewed the notes we have. The notes do not show that your doctor is looking for one of these conditions. The notes do not show that you had a recent chest x-ray with such abnormalities. Based on the information we have, this test is not medically necessary. We used [facility] Medical Benefits Management Clinical Guideline titled Imaging of the Chest to make this decision. You may view this guideline at [email or web address]/mb-guidelines-radiology.    Additional information in media     You are welcome to complete a peer to peer   Ph. [phone]  Case # [id number]           Patient is scheduled for 3.12.25. Please advise plan of care.     Thank you,  [place]  Referral specialist

## 2025-03-11 NOTE — TELEPHONE ENCOUNTER
P2p completed.    Authorized [id number]  Valid until 4/4/25    Future Appointments   Date Time Provider Department Center   4/4/2025  9:30 AM Jessie Castaneda NP [facility]   4/4/2025 10:30 AM NP OOT NP [facility]

## 2025-03-11 NOTE — TELEPHONE ENCOUNTER
Pt called stating his primary Dr. RUPA Soni is waiting for us to send over pre-op orders/letter so pt can have pre-op appt.  Is Dr. Mcguire requesting medical clearance? I'm not seeing it in pt chart?  Pls advise.

## 2025-03-12 ENCOUNTER — TELEPHONE (OUTPATIENT)
Facility: LOCATION | Age: 25
End: 2025-03-12

## 2025-03-12 NOTE — TELEPHONE ENCOUNTER
Patient informed that pre op clearance received from Dr. Peña (urology). Informed patient he will be contacted by our front office staff to schedule pre op appointment with Dr. Soni. Patient verbalized understanding.

## 2025-03-12 NOTE — TELEPHONE ENCOUNTER
No prior authorization required for cpt code 27434. Ref #: [id number]. Spoke with Ykui @ 12:37pm on 3/12

## 2025-03-14 NOTE — TELEPHONE ENCOUNTER
Patient called for status, advised patient of T/A time, patient expressed understanding. Patient WANTS FORMS UPLOADED TO [employer]. Patient also provided details.     Disability forms received in forms dept. Logged for processing, patient wants forms uploaded to [employer].         Type of Leave: disability   Reason for Leave: surgery  Start date of leave:3/25/25  End date of leave: 4/13/25 Return to work 4/14/25  How many flare ups per month/length?: n  How many appts per month/length?: n  Was Fee and Turnaround info Given?: y

## 2025-03-15 ENCOUNTER — LABORATORY ENCOUNTER (OUTPATIENT)
Dept: LAB | Age: 25
End: 2025-03-15
Attending: UROLOGY
Payer: COMMERCIAL

## 2025-03-15 DIAGNOSIS — Z01.818 PRE-OP TESTING: ICD-10-CM

## 2025-03-15 LAB
ANTIBODY SCREEN: NEGATIVE
RH BLOOD TYPE: POSITIVE

## 2025-03-15 PROCEDURE — 86901 BLOOD TYPING SEROLOGIC RH(D): CPT

## 2025-03-15 PROCEDURE — 86900 BLOOD TYPING SEROLOGIC ABO: CPT

## 2025-03-15 PROCEDURE — 86850 RBC ANTIBODY SCREEN: CPT

## 2025-03-18 ENCOUNTER — HOSPITAL ENCOUNTER (OUTPATIENT)
Dept: CT IMAGING | Age: 25
Discharge: HOME | End: 2025-03-18
Attending: UROLOGY
Payer: COMMERCIAL

## 2025-03-18 ENCOUNTER — TELEPHONE (OUTPATIENT)
Dept: FAMILY MEDICINE CLINIC | Facility: CLINIC | Age: 25
End: 2025-03-18

## 2025-03-18 DIAGNOSIS — N28.89 RENAL MASS: ICD-10-CM

## 2025-03-18 PROCEDURE — 71250 CT THORAX DX C-: CPT | Performed by: UROLOGY

## 2025-03-19 NOTE — TELEPHONE ENCOUNTER
Called patient to confirm if sx he is having it was with Dr Mcguire. Do not see a sx scheduled. Per patient he will be having sx with Dr Mcguire and Dr Peña. Informed patient I will reach out to provider for confirmation on sx. Patient verbalized understanding.    Reached out to office to confirm sx, awaiting confirmation.

## 2025-03-20 ENCOUNTER — OFFICE VISIT (OUTPATIENT)
Dept: FAMILY MEDICINE CLINIC | Facility: CLINIC | Age: 25
End: 2025-03-20
Payer: COMMERCIAL

## 2025-03-20 VITALS
DIASTOLIC BLOOD PRESSURE: 70 MMHG | HEART RATE: 93 BPM | RESPIRATION RATE: 18 BRPM | TEMPERATURE: 97 F | WEIGHT: 224 LBS | BODY MASS INDEX: 32.07 KG/M2 | SYSTOLIC BLOOD PRESSURE: 112 MMHG | HEIGHT: 70 IN

## 2025-03-20 DIAGNOSIS — Z01.818 PREOPERATIVE EXAMINATION: Primary | ICD-10-CM

## 2025-03-20 DIAGNOSIS — N28.89 RIGHT RENAL MASS: ICD-10-CM

## 2025-03-20 DIAGNOSIS — R91.8 LUNG NODULE, MULTIPLE: ICD-10-CM

## 2025-03-20 PROCEDURE — 3008F BODY MASS INDEX DOCD: CPT | Performed by: STUDENT IN AN ORGANIZED HEALTH CARE EDUCATION/TRAINING PROGRAM

## 2025-03-20 PROCEDURE — 3074F SYST BP LT 130 MM HG: CPT | Performed by: STUDENT IN AN ORGANIZED HEALTH CARE EDUCATION/TRAINING PROGRAM

## 2025-03-20 PROCEDURE — G2211 COMPLEX E/M VISIT ADD ON: HCPCS | Performed by: STUDENT IN AN ORGANIZED HEALTH CARE EDUCATION/TRAINING PROGRAM

## 2025-03-20 PROCEDURE — 3078F DIAST BP <80 MM HG: CPT | Performed by: STUDENT IN AN ORGANIZED HEALTH CARE EDUCATION/TRAINING PROGRAM

## 2025-03-20 PROCEDURE — 99214 OFFICE O/P EST MOD 30 MIN: CPT | Performed by: STUDENT IN AN ORGANIZED HEALTH CARE EDUCATION/TRAINING PROGRAM

## 2025-03-20 NOTE — H&P (VIEW-ONLY)
History and Physical  [facility]  03/20/25    Chief Complaint   Patient presents with    Pre-Op Exam     robotic right partial nephrectomy     Nolan Marítnez is a 24 year old male with a hx of right renal mass, who presents for a pre-operative physical exam at the request of Dr. Pia Peña Patient is to have right nephrecomty, to by done by Dr. Peña at [facility] on 3/25/25.     Cardiac history: denied  Anesthesia history: has not had previously  No excessive snoring.    Allergies:  Allergies[1]    Current Outpatient Medications   Medication Sig Dispense Refill    naproxen 500 MG Oral Tab Take 1 tablet (500 mg total) by mouth 2 (two) times daily with meals. (Patient not taking: Reported on 3/20/2025) 20 tablet 0     Past Medical History:    Cancer (HCC)    right kidney    Constipation    Right kidney mass    Visual impairment    glasses     History reviewed. No pertinent surgical history.  History reviewed. No pertinent family history.  Social History     Socioeconomic History    Marital status: Single   Tobacco Use    Smoking status: Never    Smokeless tobacco: Never   Vaping Use    Vaping status: Never Used   Substance and Sexual Activity    Alcohol use: Yes     Comment: 1 drink a year    Drug use: Never   Other Topics Concern    Caffeine Concern Yes    Exercise Yes    Seat Belt Yes         REVIEW OF SYSTEMS:  GENERAL: feels well otherwise and denies fever  SKIN: denies any unusual skin lesions and ---  EYES:denies blurred vision or double vision  HEENT: denies nasal congestion, sinus pain or ST  LUNGS: denies shortness of breath with exertion and denies cough  CARDIOVASCULAR: denies chest pain on exertion  GI: denies abdominal pain, denies diarrhea, and denies constipation  : denies nocturia or changes in stream and denies dysuria  MUSCULOSKELETAL: denies back pain  NEURO: denies headaches  PSYCHE: denies depression or anxiety  HEMATOLOGIC: denies hx of  anemia  ENDOCRINE: denies thyroid history  ALL/ASTHMA: hx of allergies    EXAM:  /70   Pulse 93   Temp 97.2 °F (36.2 °C) (Temporal)   Resp 18   Ht 5' 10\" (1.778 m)   Wt 224 lb (101.6 kg)   BMI 32.14 kg/m²   GENERAL: well developed, well nourished,in no apparent distress  SKIN: no rashes,no suspicious lesions  HEENT: atraumatic, normocephalic,ears and throat are clear  EYES:PERRLA, EOMI, conjunctiva are clear  NECK: supple and no adenopathy  BREAST: deferred  LUNGS: clear to auscultation  CARDIO: RRR without murmur  GI: good BS's,no masses, HSM or tenderness  : deferred  RECTAL: deferred  MUSCULOSKELETAL: back is not tender,FROM of the back  EXTREMITIES: no cyanosis, clubbing or edema  NEURO: Oriented times three,cranial nerves are intact,motor and sensory are grossly intact      ASSESSMENT AND PLAN:  Nolan Martínez is a 24 year old male who presents for preop exam.  Patient is medically optimized for the planned procedure.    Stop all supplements and vitamins 1 week prior to surgery.  Stop all NSAID's (ibuprofen/motrin/naproxen/aleve and aspirin) 1 week prior to surgery.    Thank you,    Quita Soni MD  [facility]  03/20/25    [facility], [address] [place] [address]  Dept: [phone]  Dept Fax: [phone]         [1]   Allergies  Allergen Reactions    Cherry Coughing

## 2025-03-20 NOTE — H&P
History and Physical  [facility]  03/20/25    Chief Complaint   Patient presents with    Pre-Op Exam     robotic right partial nephrectomy     Nolan Martínez is a 24 year old male with a hx of right renal mass, who presents for a pre-operative physical exam at the request of Dr. Pia Peña Patient is to have right nephrecomty, to by done by Dr. Peña at [facility] on 3/25/25.     Cardiac history: denied  Anesthesia history: has not had previously  No excessive snoring.    Allergies:  Allergies[1]    Current Outpatient Medications   Medication Sig Dispense Refill    naproxen 500 MG Oral Tab Take 1 tablet (500 mg total) by mouth 2 (two) times daily with meals. (Patient not taking: Reported on 3/20/2025) 20 tablet 0     Past Medical History:    Cancer (HCC)    right kidney    Constipation    Right kidney mass    Visual impairment    glasses     History reviewed. No pertinent surgical history.  History reviewed. No pertinent family history.  Social History     Socioeconomic History    Marital status: Single   Tobacco Use    Smoking status: Never    Smokeless tobacco: Never   Vaping Use    Vaping status: Never Used   Substance and Sexual Activity    Alcohol use: Yes     Comment: 1 drink a year    Drug use: Never   Other Topics Concern    Caffeine Concern Yes    Exercise Yes    Seat Belt Yes         REVIEW OF SYSTEMS:  GENERAL: feels well otherwise and denies fever  SKIN: denies any unusual skin lesions and ---  EYES:denies blurred vision or double vision  HEENT: denies nasal congestion, sinus pain or ST  LUNGS: denies shortness of breath with exertion and denies cough  CARDIOVASCULAR: denies chest pain on exertion  GI: denies abdominal pain, denies diarrhea, and denies constipation  : denies nocturia or changes in stream and denies dysuria  MUSCULOSKELETAL: denies back pain  NEURO: denies headaches  PSYCHE: denies depression or anxiety  HEMATOLOGIC: denies hx of  anemia  ENDOCRINE: denies thyroid history  ALL/ASTHMA: hx of allergies    EXAM:  /70   Pulse 93   Temp 97.2 °F (36.2 °C) (Temporal)   Resp 18   Ht 5' 10\" (1.778 m)   Wt 224 lb (101.6 kg)   BMI 32.14 kg/m²   GENERAL: well developed, well nourished,in no apparent distress  SKIN: no rashes,no suspicious lesions  HEENT: atraumatic, normocephalic,ears and throat are clear  EYES:PERRLA, EOMI, conjunctiva are clear  NECK: supple and no adenopathy  BREAST: deferred  LUNGS: clear to auscultation  CARDIO: RRR without murmur  GI: good BS's,no masses, HSM or tenderness  : deferred  RECTAL: deferred  MUSCULOSKELETAL: back is not tender,FROM of the back  EXTREMITIES: no cyanosis, clubbing or edema  NEURO: Oriented times three,cranial nerves are intact,motor and sensory are grossly intact      ASSESSMENT AND PLAN:  Nolan Martínez is a 24 year old male who presents for preop exam.  Patient is medically optimized for the planned procedure.    Stop all supplements and vitamins 1 week prior to surgery.  Stop all NSAID's (ibuprofen/motrin/naproxen/aleve and aspirin) 1 week prior to surgery.    Thank you,    Quita Soni MD  [facility]  03/20/25    [facility], [address] [place] [id number]  Dept: [phone]  Dept Fax: [phone]         [1]   Allergies  Allergen Reactions    Cherry Coughing

## 2025-03-21 NOTE — TELEPHONE ENCOUNTER
Dr. Mcguire,    Please sign off on form if you agree to: disability due to Laparoscopy cholecystectomy start date 03/25/25-04/13/25 Return to work 04/14/25    -Signature page will be the first page scanned  -From your Inbasket, Highlight the patient and click Chart   -Double click the 03/11/2025 Forms Completion telephone encounter  -Scroll down to the Media section   -Click the blue Hyperlink: disability Dr Mcguire 03/21/25  -Click Acknowledge located in the top right ribbon/menu   -Drag the mouse into the blank space of the document and a + sign will appear. Left click to   electronically sign the document.  -Once signed, simply exit out of the screen and you signature will be saved.     Thank you,    Soumya

## 2025-03-24 ENCOUNTER — ANESTHESIA EVENT (OUTPATIENT)
Dept: SURGERY | Facility: HOSPITAL | Age: 25
End: 2025-03-24
Payer: COMMERCIAL

## 2025-03-24 NOTE — TELEPHONE ENCOUNTER
Patient called for status- Sent authorization via My Chart . Se TE 3/18. Valid Authorization received. E-faxed 3/24/25 @9:48 am to [phone]  . Pending Confirmation.

## 2025-03-25 ENCOUNTER — ANESTHESIA (OUTPATIENT)
Dept: SURGERY | Facility: HOSPITAL | Age: 25
End: 2025-03-25
Payer: COMMERCIAL

## 2025-03-25 ENCOUNTER — HOSPITAL ENCOUNTER (OUTPATIENT)
Facility: HOSPITAL | Age: 25
Discharge: HOME | End: 2025-03-26
Attending: UROLOGY | Admitting: UROLOGY
Payer: COMMERCIAL

## 2025-03-25 DIAGNOSIS — Z01.818 PRE-OP TESTING: Primary | ICD-10-CM

## 2025-03-25 DIAGNOSIS — N28.89 RIGHT KIDNEY MASS: ICD-10-CM

## 2025-03-25 DIAGNOSIS — N28.89 RIGHT RENAL MASS: ICD-10-CM

## 2025-03-25 PROBLEM — K81.1 CHRONIC CHOLECYSTITIS: Status: ACTIVE | Noted: 2025-03-25

## 2025-03-25 LAB — RH BLOOD TYPE: POSITIVE

## 2025-03-25 PROCEDURE — BF50200 OTHER IMAGING OF BILE DUCTS USING FLUORESCING AGENT, INDOCYANINE GREEN DYE, INTRAOPERATIVE: ICD-10-PCS | Performed by: SURGERY

## 2025-03-25 PROCEDURE — 76942 ECHO GUIDE FOR BIOPSY: CPT | Performed by: STUDENT IN AN ORGANIZED HEALTH CARE EDUCATION/TRAINING PROGRAM

## 2025-03-25 PROCEDURE — 8E0W4CZ ROBOTIC ASSISTED PROCEDURE OF TRUNK REGION, PERCUTANEOUS ENDOSCOPIC APPROACH: ICD-10-PCS | Performed by: UROLOGY

## 2025-03-25 PROCEDURE — 0TB04ZZ EXCISION OF RIGHT KIDNEY, PERCUTANEOUS ENDOSCOPIC APPROACH: ICD-10-PCS | Performed by: UROLOGY

## 2025-03-25 PROCEDURE — 99204 OFFICE O/P NEW MOD 45 MIN: CPT | Performed by: INTERNAL MEDICINE

## 2025-03-25 PROCEDURE — 47562 LAPAROSCOPIC CHOLECYSTECTOMY: CPT | Performed by: SURGERY

## 2025-03-25 PROCEDURE — 0FT44ZZ RESECTION OF GALLBLADDER, PERCUTANEOUS ENDOSCOPIC APPROACH: ICD-10-PCS | Performed by: SURGERY

## 2025-03-25 PROCEDURE — 47562 LAPAROSCOPIC CHOLECYSTECTOMY: CPT

## 2025-03-25 RX ORDER — PROCHLORPERAZINE EDISYLATE 5 MG/ML
5 INJECTION INTRAMUSCULAR; INTRAVENOUS EVERY 8 HOURS PRN
Status: DISCONTINUED | OUTPATIENT
Start: 2025-03-25 | End: 2025-03-25 | Stop reason: HOSPADM

## 2025-03-25 RX ORDER — SODIUM CHLORIDE, SODIUM LACTATE, POTASSIUM CHLORIDE, CALCIUM CHLORIDE 600; 310; 30; 20 MG/100ML; MG/100ML; MG/100ML; MG/100ML
INJECTION, SOLUTION INTRAVENOUS CONTINUOUS
Status: DISCONTINUED | OUTPATIENT
Start: 2025-03-25 | End: 2025-03-25 | Stop reason: HOSPADM

## 2025-03-25 RX ORDER — HYDROMORPHONE HYDROCHLORIDE 1 MG/ML
0.4 INJECTION, SOLUTION INTRAMUSCULAR; INTRAVENOUS; SUBCUTANEOUS EVERY 5 MIN PRN
Status: DISCONTINUED | OUTPATIENT
Start: 2025-03-25 | End: 2025-03-25 | Stop reason: HOSPADM

## 2025-03-25 RX ORDER — ACETAMINOPHEN 10 MG/ML
1000 INJECTION, SOLUTION INTRAVENOUS ONCE
Status: COMPLETED | OUTPATIENT
Start: 2025-03-25 | End: 2025-03-25

## 2025-03-25 RX ORDER — ONDANSETRON 2 MG/ML
4 INJECTION INTRAMUSCULAR; INTRAVENOUS EVERY 6 HOURS PRN
Status: DISCONTINUED | OUTPATIENT
Start: 2025-03-25 | End: 2025-03-26

## 2025-03-25 RX ORDER — HYDROCODONE BITARTRATE AND ACETAMINOPHEN 5; 325 MG/1; MG/1
1 TABLET ORAL ONCE AS NEEDED
Status: DISCONTINUED | OUTPATIENT
Start: 2025-03-25 | End: 2025-03-25 | Stop reason: HOSPADM

## 2025-03-25 RX ORDER — HYDROMORPHONE HYDROCHLORIDE 1 MG/ML
0.8 INJECTION, SOLUTION INTRAMUSCULAR; INTRAVENOUS; SUBCUTANEOUS EVERY 2 HOUR PRN
Status: DISCONTINUED | OUTPATIENT
Start: 2025-03-25 | End: 2025-03-26

## 2025-03-25 RX ORDER — HYDROMORPHONE HYDROCHLORIDE 1 MG/ML
0.4 INJECTION, SOLUTION INTRAMUSCULAR; INTRAVENOUS; SUBCUTANEOUS EVERY 2 HOUR PRN
Status: DISCONTINUED | OUTPATIENT
Start: 2025-03-25 | End: 2025-03-26

## 2025-03-25 RX ORDER — INDOCYANINE GREEN AND WATER 25 MG
5 KIT INJECTION ONCE
Status: COMPLETED | OUTPATIENT
Start: 2025-03-25 | End: 2025-03-25

## 2025-03-25 RX ORDER — SODIUM CHLORIDE, SODIUM LACTATE, POTASSIUM CHLORIDE, CALCIUM CHLORIDE 600; 310; 30; 20 MG/100ML; MG/100ML; MG/100ML; MG/100ML
INJECTION, SOLUTION INTRAVENOUS CONTINUOUS
Status: DISCONTINUED | OUTPATIENT
Start: 2025-03-25 | End: 2025-03-25

## 2025-03-25 RX ORDER — MIDAZOLAM HYDROCHLORIDE 1 MG/ML
1 INJECTION INTRAMUSCULAR; INTRAVENOUS EVERY 5 MIN PRN
Status: DISCONTINUED | OUTPATIENT
Start: 2025-03-25 | End: 2025-03-25 | Stop reason: HOSPADM

## 2025-03-25 RX ORDER — ONDANSETRON 2 MG/ML
4 INJECTION INTRAMUSCULAR; INTRAVENOUS EVERY 6 HOURS PRN
Status: DISCONTINUED | OUTPATIENT
Start: 2025-03-25 | End: 2025-03-25 | Stop reason: HOSPADM

## 2025-03-25 RX ORDER — SODIUM PHOSPHATE, DIBASIC AND SODIUM PHOSPHATE, MONOBASIC 7; 19 G/230ML; G/230ML
1 ENEMA RECTAL ONCE AS NEEDED
Status: DISCONTINUED | OUTPATIENT
Start: 2025-03-25 | End: 2025-03-26

## 2025-03-25 RX ORDER — GLYCOPYRROLATE 0.2 MG/ML
INJECTION, SOLUTION INTRAMUSCULAR; INTRAVENOUS AS NEEDED
Status: DISCONTINUED | OUTPATIENT
Start: 2025-03-25 | End: 2025-03-25 | Stop reason: SURG

## 2025-03-25 RX ORDER — DIPHENHYDRAMINE HYDROCHLORIDE 50 MG/ML
12.5 INJECTION, SOLUTION INTRAMUSCULAR; INTRAVENOUS AS NEEDED
Status: DISCONTINUED | OUTPATIENT
Start: 2025-03-25 | End: 2025-03-25 | Stop reason: HOSPADM

## 2025-03-25 RX ORDER — ENOXAPARIN SODIUM 100 MG/ML
40 INJECTION SUBCUTANEOUS DAILY
Status: DISCONTINUED | OUTPATIENT
Start: 2025-03-26 | End: 2025-03-26

## 2025-03-25 RX ORDER — HEPARIN SODIUM 5000 [USP'U]/ML
5000 INJECTION, SOLUTION INTRAVENOUS; SUBCUTANEOUS ONCE
Status: COMPLETED | OUTPATIENT
Start: 2025-03-25 | End: 2025-03-25

## 2025-03-25 RX ORDER — ACETAMINOPHEN 325 MG/1
650 TABLET ORAL
Status: DISCONTINUED | OUTPATIENT
Start: 2025-03-25 | End: 2025-03-26

## 2025-03-25 RX ORDER — BISACODYL 10 MG
10 SUPPOSITORY, RECTAL RECTAL
Status: DISCONTINUED | OUTPATIENT
Start: 2025-03-25 | End: 2025-03-26

## 2025-03-25 RX ORDER — LIDOCAINE HYDROCHLORIDE ANHYDROUS AND DEXTROSE MONOHYDRATE .8; 5 G/100ML; G/100ML
INJECTION, SOLUTION INTRAVENOUS CONTINUOUS PRN
Status: DISCONTINUED | OUTPATIENT
Start: 2025-03-25 | End: 2025-03-25 | Stop reason: SURG

## 2025-03-25 RX ORDER — OXYCODONE HYDROCHLORIDE 10 MG/1
10 TABLET ORAL EVERY 4 HOURS PRN
Status: DISCONTINUED | OUTPATIENT
Start: 2025-03-25 | End: 2025-03-26

## 2025-03-25 RX ORDER — HYDROMORPHONE HYDROCHLORIDE 1 MG/ML
INJECTION, SOLUTION INTRAMUSCULAR; INTRAVENOUS; SUBCUTANEOUS
Status: COMPLETED
Start: 2025-03-25 | End: 2025-03-25

## 2025-03-25 RX ORDER — NALOXONE HYDROCHLORIDE 0.4 MG/ML
0.08 INJECTION, SOLUTION INTRAMUSCULAR; INTRAVENOUS; SUBCUTANEOUS AS NEEDED
Status: DISCONTINUED | OUTPATIENT
Start: 2025-03-25 | End: 2025-03-25 | Stop reason: HOSPADM

## 2025-03-25 RX ORDER — HYDROMORPHONE HYDROCHLORIDE 1 MG/ML
0.6 INJECTION, SOLUTION INTRAMUSCULAR; INTRAVENOUS; SUBCUTANEOUS EVERY 5 MIN PRN
Status: DISCONTINUED | OUTPATIENT
Start: 2025-03-25 | End: 2025-03-25 | Stop reason: HOSPADM

## 2025-03-25 RX ORDER — FAMOTIDINE 20 MG/1
20 TABLET, FILM COATED ORAL 2 TIMES DAILY
Status: DISCONTINUED | OUTPATIENT
Start: 2025-03-25 | End: 2025-03-26

## 2025-03-25 RX ORDER — ACETAMINOPHEN 500 MG
1000 TABLET ORAL ONCE AS NEEDED
Status: DISCONTINUED | OUTPATIENT
Start: 2025-03-25 | End: 2025-03-25 | Stop reason: HOSPADM

## 2025-03-25 RX ORDER — HYDROMORPHONE HYDROCHLORIDE 1 MG/ML
0.2 INJECTION, SOLUTION INTRAMUSCULAR; INTRAVENOUS; SUBCUTANEOUS EVERY 5 MIN PRN
Status: DISCONTINUED | OUTPATIENT
Start: 2025-03-25 | End: 2025-03-25 | Stop reason: HOSPADM

## 2025-03-25 RX ORDER — MIDAZOLAM HYDROCHLORIDE 1 MG/ML
INJECTION INTRAMUSCULAR; INTRAVENOUS AS NEEDED
Status: DISCONTINUED | OUTPATIENT
Start: 2025-03-25 | End: 2025-03-25 | Stop reason: SURG

## 2025-03-25 RX ORDER — ROPIVACAINE HYDROCHLORIDE 5 MG/ML
INJECTION, SOLUTION EPIDURAL; INFILTRATION; PERINEURAL AS NEEDED
Status: DISCONTINUED | OUTPATIENT
Start: 2025-03-25 | End: 2025-03-25 | Stop reason: SURG

## 2025-03-25 RX ORDER — DOCUSATE SODIUM 100 MG/1
100 CAPSULE, LIQUID FILLED ORAL 2 TIMES DAILY
Status: DISCONTINUED | OUTPATIENT
Start: 2025-03-25 | End: 2025-03-26

## 2025-03-25 RX ORDER — DEXAMETHASONE SODIUM PHOSPHATE 10 MG/ML
INJECTION, SOLUTION INTRAMUSCULAR; INTRAVENOUS AS NEEDED
Status: DISCONTINUED | OUTPATIENT
Start: 2025-03-25 | End: 2025-03-25 | Stop reason: SURG

## 2025-03-25 RX ORDER — KETAMINE HYDROCHLORIDE 50 MG/ML
INJECTION, SOLUTION INTRAMUSCULAR; INTRAVENOUS AS NEEDED
Status: DISCONTINUED | OUTPATIENT
Start: 2025-03-25 | End: 2025-03-25 | Stop reason: SURG

## 2025-03-25 RX ORDER — LABETALOL HYDROCHLORIDE 5 MG/ML
5 INJECTION, SOLUTION INTRAVENOUS EVERY 5 MIN PRN
Status: DISCONTINUED | OUTPATIENT
Start: 2025-03-25 | End: 2025-03-25 | Stop reason: HOSPADM

## 2025-03-25 RX ORDER — SODIUM CHLORIDE 9 MG/ML
INJECTION, SOLUTION INTRAVENOUS CONTINUOUS
Status: DISCONTINUED | OUTPATIENT
Start: 2025-03-25 | End: 2025-03-26

## 2025-03-25 RX ORDER — FAMOTIDINE 10 MG/ML
20 INJECTION, SOLUTION INTRAVENOUS 2 TIMES DAILY
Status: DISCONTINUED | OUTPATIENT
Start: 2025-03-25 | End: 2025-03-26

## 2025-03-25 RX ORDER — HYDROCODONE BITARTRATE AND ACETAMINOPHEN 5; 325 MG/1; MG/1
2 TABLET ORAL ONCE AS NEEDED
Status: DISCONTINUED | OUTPATIENT
Start: 2025-03-25 | End: 2025-03-25 | Stop reason: HOSPADM

## 2025-03-25 RX ORDER — PHENYLEPHRINE HCL 10 MG/ML
VIAL (ML) INJECTION AS NEEDED
Status: DISCONTINUED | OUTPATIENT
Start: 2025-03-25 | End: 2025-03-25 | Stop reason: SURG

## 2025-03-25 RX ORDER — MEPERIDINE HYDROCHLORIDE 25 MG/ML
12.5 INJECTION INTRAMUSCULAR; INTRAVENOUS; SUBCUTANEOUS AS NEEDED
Status: DISCONTINUED | OUTPATIENT
Start: 2025-03-25 | End: 2025-03-25 | Stop reason: HOSPADM

## 2025-03-25 RX ORDER — NEOSTIGMINE METHYLSULFATE 1 MG/ML
INJECTION INTRAVENOUS AS NEEDED
Status: DISCONTINUED | OUTPATIENT
Start: 2025-03-25 | End: 2025-03-25 | Stop reason: SURG

## 2025-03-25 RX ORDER — LIDOCAINE HYDROCHLORIDE 10 MG/ML
INJECTION, SOLUTION EPIDURAL; INFILTRATION; INTRACAUDAL; PERINEURAL AS NEEDED
Status: DISCONTINUED | OUTPATIENT
Start: 2025-03-25 | End: 2025-03-25 | Stop reason: SURG

## 2025-03-25 RX ORDER — ACETAMINOPHEN 10 MG/ML
INJECTION, SOLUTION INTRAVENOUS
Status: COMPLETED
Start: 2025-03-25 | End: 2025-03-25

## 2025-03-25 RX ORDER — POLYETHYLENE GLYCOL 3350 17 G/17G
17 POWDER, FOR SOLUTION ORAL DAILY PRN
Status: DISCONTINUED | OUTPATIENT
Start: 2025-03-25 | End: 2025-03-26

## 2025-03-25 RX ORDER — SODIUM CHLORIDE 9 MG/ML
INJECTION, SOLUTION INTRAVENOUS CONTINUOUS PRN
Status: DISCONTINUED | OUTPATIENT
Start: 2025-03-25 | End: 2025-03-25 | Stop reason: SURG

## 2025-03-25 RX ORDER — ROCURONIUM BROMIDE 10 MG/ML
INJECTION, SOLUTION INTRAVENOUS AS NEEDED
Status: DISCONTINUED | OUTPATIENT
Start: 2025-03-25 | End: 2025-03-25 | Stop reason: SURG

## 2025-03-25 RX ORDER — DEXAMETHASONE SODIUM PHOSPHATE 4 MG/ML
VIAL (ML) INJECTION AS NEEDED
Status: DISCONTINUED | OUTPATIENT
Start: 2025-03-25 | End: 2025-03-25 | Stop reason: SURG

## 2025-03-25 RX ORDER — BUPIVACAINE HYDROCHLORIDE 2.5 MG/ML
INJECTION, SOLUTION EPIDURAL; INFILTRATION; INTRACAUDAL; PERINEURAL AS NEEDED
Status: DISCONTINUED | OUTPATIENT
Start: 2025-03-25 | End: 2025-03-25 | Stop reason: HOSPADM

## 2025-03-25 RX ORDER — ONDANSETRON 2 MG/ML
INJECTION INTRAMUSCULAR; INTRAVENOUS AS NEEDED
Status: DISCONTINUED | OUTPATIENT
Start: 2025-03-25 | End: 2025-03-25 | Stop reason: SURG

## 2025-03-25 RX ORDER — SENNOSIDES 8.6 MG
17.2 TABLET ORAL NIGHTLY
Status: DISCONTINUED | OUTPATIENT
Start: 2025-03-25 | End: 2025-03-26

## 2025-03-25 RX ORDER — ACETAMINOPHEN 500 MG
1000 TABLET ORAL ONCE
Status: DISCONTINUED | OUTPATIENT
Start: 2025-03-25 | End: 2025-03-25 | Stop reason: HOSPADM

## 2025-03-25 RX ORDER — PROCHLORPERAZINE EDISYLATE 5 MG/ML
5 INJECTION INTRAMUSCULAR; INTRAVENOUS EVERY 8 HOURS PRN
Status: DISCONTINUED | OUTPATIENT
Start: 2025-03-25 | End: 2025-03-26

## 2025-03-25 RX ORDER — OXYCODONE HYDROCHLORIDE 5 MG/1
5 TABLET ORAL EVERY 4 HOURS PRN
Status: DISCONTINUED | OUTPATIENT
Start: 2025-03-25 | End: 2025-03-26

## 2025-03-25 RX ADMIN — NEOSTIGMINE METHYLSULFATE 3 MG: 1 INJECTION INTRAVENOUS at 12:43:00

## 2025-03-25 RX ADMIN — ROPIVACAINE HYDROCHLORIDE 20 ML: 5 INJECTION, SOLUTION EPIDURAL; INFILTRATION; PERINEURAL at 07:50:00

## 2025-03-25 RX ADMIN — SODIUM CHLORIDE: 9 INJECTION, SOLUTION INTRAVENOUS at 07:50:00

## 2025-03-25 RX ADMIN — DEXAMETHASONE SODIUM PHOSPHATE 2 MG: 10 INJECTION, SOLUTION INTRAMUSCULAR; INTRAVENOUS at 07:47:00

## 2025-03-25 RX ADMIN — ROCURONIUM BROMIDE 10 MG: 10 INJECTION, SOLUTION INTRAVENOUS at 11:21:00

## 2025-03-25 RX ADMIN — ONDANSETRON 4 MG: 2 INJECTION INTRAMUSCULAR; INTRAVENOUS at 08:35:00

## 2025-03-25 RX ADMIN — LIDOCAINE HYDROCHLORIDE 10 ML: 10 INJECTION, SOLUTION EPIDURAL; INFILTRATION; INTRACAUDAL; PERINEURAL at 07:38:00

## 2025-03-25 RX ADMIN — SODIUM CHLORIDE, SODIUM LACTATE, POTASSIUM CHLORIDE, CALCIUM CHLORIDE: 600; 310; 30; 20 INJECTION, SOLUTION INTRAVENOUS at 12:24:00

## 2025-03-25 RX ADMIN — KETAMINE HYDROCHLORIDE 50 MG: 50 INJECTION, SOLUTION INTRAMUSCULAR; INTRAVENOUS at 07:38:00

## 2025-03-25 RX ADMIN — GLYCOPYRROLATE 0.4 MG: 0.2 INJECTION, SOLUTION INTRAMUSCULAR; INTRAVENOUS at 12:43:00

## 2025-03-25 RX ADMIN — ROCURONIUM BROMIDE 50 MG: 10 INJECTION, SOLUTION INTRAVENOUS at 07:38:00

## 2025-03-25 RX ADMIN — ROCURONIUM BROMIDE 10 MG: 10 INJECTION, SOLUTION INTRAVENOUS at 08:34:00

## 2025-03-25 RX ADMIN — DEXAMETHASONE SODIUM PHOSPHATE 4 MG: 4 MG/ML VIAL (ML) INJECTION at 08:00:00

## 2025-03-25 RX ADMIN — PHENYLEPHRINE HCL 100 MCG: 10 MG/ML VIAL (ML) INJECTION at 10:50:00

## 2025-03-25 RX ADMIN — LIDOCAINE HYDROCHLORIDE ANHYDROUS AND DEXTROSE MONOHYDRATE 2 MG/KG/HR: .8; 5 INJECTION, SOLUTION INTRAVENOUS at 07:55:00

## 2025-03-25 RX ADMIN — PHENYLEPHRINE HCL 100 MCG: 10 MG/ML VIAL (ML) INJECTION at 11:01:00

## 2025-03-25 RX ADMIN — ROCURONIUM BROMIDE 10 MG: 10 INJECTION, SOLUTION INTRAVENOUS at 10:12:00

## 2025-03-25 RX ADMIN — SODIUM CHLORIDE, SODIUM LACTATE, POTASSIUM CHLORIDE, CALCIUM CHLORIDE: 600; 310; 30; 20 INJECTION, SOLUTION INTRAVENOUS at 07:31:00

## 2025-03-25 RX ADMIN — MIDAZOLAM HYDROCHLORIDE 2 MG: 1 INJECTION INTRAMUSCULAR; INTRAVENOUS at 07:31:00

## 2025-03-25 RX ADMIN — ROCURONIUM BROMIDE 10 MG: 10 INJECTION, SOLUTION INTRAVENOUS at 09:30:00

## 2025-03-25 NOTE — ANESTHESIA PREPROCEDURE EVALUATION
PRE-OP EVALUATION    Patient Name: Nolan Martínez Jr.    Admit Diagnosis: RIGHT KIDNEY MASS    Pre-op Diagnosis: RIGHT KIDNEY MASS    XI ROBOT-ASSISTED LAPAROSCOPIC RIGHT PARTIAL NEPHRECTOMY (FELIX)    Anesthesia Procedure: XI ROBOT-ASSISTED LAPAROSCOPIC RIGHT PARTIAL NEPHRECTOMY (FELIX) (Right: Abdomen)  LAPAROSCOPIC CHOLECYSTECTOMY WITH INDOCYANINE GREEN (BYRON) (Abdomen)    Surgeons and Role:  Panel 1:     * Pia Peña MD - Primary  Panel 2:     * Marito Mcguire MD - Primary    Pre-op vitals reviewed.  Temp: 98.1 °F (36.7 °C)  Pulse: 89  Resp: 16  BP: 133/97  SpO2: 98 %  Body mass index is 31.85 kg/m².    Current medications reviewed.  Hospital Medications:  • [Transfer Hold] acetaminophen (Tylenol Extra Strength) tab 1,000 mg  1,000 mg Oral Once   • lactated ringers infusion   Intravenous Continuous   • [COMPLETED] heparin (Porcine) 5000 UNIT/ML injection 5,000 Units  5,000 Units Subcutaneous Once   • [COMPLETED] ceFAZolin (Ancef) 2g in 10mL IV syringe premix  2 g Intravenous Once   • [COMPLETED] indocyanine green (IC-Green) injection 5 mg  5 mg Intravenous Once       Outpatient Medications:   Prescriptions Prior to Admission[1]    Allergies: Cherry      Anesthesia Evaluation    Patient summary reviewed.    Anesthetic Complications  (-) history of anesthetic complications         GI/Hepatic/Renal  Comment: Biliary colic, renal mass                                Cardiovascular    Negative cardiovascular ROS.    Exercise tolerance: good     MET: >4    (+) obesity                                       Endo/Other    Negative endo/other ROS.                              Pulmonary    Negative pulmonary ROS.                       Neuro/Psych    Negative neuro/psych ROS.                              History reviewed. No pertinent surgical history.  Social History     Socioeconomic History   • Marital status: Single   Tobacco Use   • Smoking status: Never   • Smokeless tobacco: Never   Vaping  Use   • Vaping status: Never Used   Substance and Sexual Activity   • Alcohol use: Yes     Comment: 1 drink a year   • Drug use: Never   Other Topics Concern   • Caffeine Concern Yes   • Exercise Yes   • Seat Belt Yes     History   Drug Use Unknown     Available pre-op labs reviewed.  Lab Results   Component Value Date    WBC 7.5 01/29/2025    RBC 4.97 01/29/2025    HGB 15.2 01/29/2025    HCT 45.5 01/29/2025    MCV 91.5 01/29/2025    MCH 30.6 01/29/2025    MCHC 33.4 01/29/2025    RDW 12.6 01/29/2025    .0 01/29/2025     Lab Results   Component Value Date     01/29/2025    K 3.8 01/29/2025     01/29/2025    CO2 25.0 01/29/2025    BUN 16 01/29/2025    CREATSERUM 0.90 01/29/2025    GLU 86 01/29/2025    CA 9.3 01/29/2025            Airway      Mallampati: II  Mouth opening: >3 FB  TM distance: > 6 cm   Cardiovascular    Cardiovascular exam normal.         Dental  Comment: Denies chipped or loose teeth. Discussed risk of dental injury with anesthesia maddi to weakened teeth.            Pulmonary    Pulmonary exam normal.                 Other findings        ASA: 2   Plan: general  NPO status verified and patient meets guidelines.    Post-procedure pain management plan discussed with surgeon and patient.  Surgeon requests: regional block    Plan/risks discussed with: patient            Present on Admission:  **None**             [1]   No medications prior to admission.

## 2025-03-25 NOTE — ANESTHESIA PROCEDURE NOTES
Regional Block    Date/Time: 3/25/2025 7:44 AM    Performed by: Jennifer Adams MD  Authorized by: Jennifer Adams MD      General Information and Staff    Start Time:  3/25/2025 7:44 AM  End Time:  3/25/2025 7:47 AM  Anesthesiologist:  Jennifer Adams MD  Performed by:  Anesthesiologist  Patient Location:  OR    Block Placement: Post Induction  Site Identification: real time ultrasound guided and image stored and retrievable    Block site/laterality marked before start: site marked  Reason for Block: at surgeon's request and post-op pain management    Preanesthetic Checklist: 2 patient identifers, IV checked, risks and benefits discussed, monitors and equipment checked, pre-op evaluation, timeout performed, anesthesia consent, sterile technique used, no prohibitive neurological deficits and no local skin infection at insertion site      Procedure Details    Patient Position:  Supine  Prep: ChloraPrep    Monitoring:  Cardiac monitor, continuous pulse ox, blood pressure cuff and heart rate  Block Type:  TAP  Laterality:  Right  Injection Technique:  Single-shot    Needle    Needle Type:  Short-bevel and echogenic  Needle Gauge:  21 G  Needle Length:  100 mm  Needle Localization:  Ultrasound guidance  Reason for Ultrasound Use: appropriate spread of the medication was noted in real time and no ultrasound evidence of intravascular and/or intraneural injection            Assessment    Injection Assessment:  Good spread noted, negative resistance, negative aspiration for heme, incremental injection and low pressure  Heart Rate Change: No    - Patient tolerated block procedure well without evidence of immediate block related complications.     Medications  3/25/2025 7:44 AM      Additional Comments    Medication:  Ropivacaine 0.5% 20 mL with 2 mg PF dexamethasone

## 2025-03-25 NOTE — OPERATIVE REPORT
OPERATIVE REPORT     PREOPERATIVE DIAGNOSIS: Chronic cholecystitis.  POSTOPERATIVE DIAGNOSIS:    1.       Chronic cholecystitis.  2.       Normal anatomy by ICG imaging      PROCEDURE:  Xi robotic cholecystectomy with ICG imaging.     ASSISTANT: Ms Aminata PA-C.     ANESTHESIA:  General endotracheal anesthesia.     Anesthesiologist.: Jennifer Adams MD     BLOOD LOSS:  Less than 5 mL.     COMPLICATIONS:  None apparent.     SPECIMENS:  Gallbladder.     DISPOSITION:  The patient was awakened from anesthesia and brought to the recovery room in stable condition. The patient tolerated the procedure without apparent complication.  Needle, sponge, and instrument counts were correct at the end the procedure.     Please note, preprocedure antibiotic and DVT prophylaxis were administered per protocol and a time-out was performed prior to initiating the procedure identifying the correct patient, procedure, and surgeon.     INDICATIONS:  Please review the preprocedural history and physical.  Briefly, the patient is a 24 year old male who presents with chronic right upper quadrant pain.  Workup is consistent with chronic cholecystitis and kidney mass suspicious for cancer.  Please review Dr. Peña's reports for further details.  Risks, benefits, and alternatives of surgical procedure were explained to the patient in detail.  Risks explained included, but were not limited to, bleeding, infection, injury to adjacent organs and structures, bile duct injury including injury to the common bile duct which may potentially require repair, exploration, or reconstruction by hepatobiliary surgeon.  The possibility of nontherapeutic yield was also explained to the patient.  The patient voiced understanding.  All of the patient's pertinent questions were answered after which the patient provided willing and informed consent to proceed with surgery.     FINDINGS:    1.       Chronic inflammation of the gallbladder.  2.       Firefly  illumination reveals the anatomy of the cystic duct, common hepatic duct, and common bile duct; this correlates with the findings on intraoperative cholangiogram.    Please note Dr. Peña initially completed robotic partial nephrectomy after which care of the patient was turned over to me.  I performed robotic assisted cholecystectomy with ICG imaging as described below.  Please see Dr. Peña's operative report for further details     OPERATIVE TECHNIQUE:   At the level of the umbilicus, a 12 mm incision was created through which a Veress needle was placed into the abdomen and pneumoperitoneum established in usual manner.  Under direct vision, using a Visiport trocar, access was gained into a robotic Visiport.    Three additional robotic trocars were placed to the left and to the right of the camera port under direct vision.  At this point, the patient was positioned head up and right side up.  The robot was docked and robotic instruments were placed.  The gallbladder was then grasped, elevated, and retracted.  Firefly visualization is used to assist in identifying cystic duct and common bile duct junction.  Once this was identified, blunt and sharp dissection was initiated with judicious use of electrocautery to dissect the Calot triangle and to obtain a critical view of safety.  Once a critical view of safety was identified, the cystic duct and cystic artery were noted as the only 2 tubular structures entering into the gallbladder.  Lateral dissection of the peritoneal covering of the gallbladder was also achieved to provide full mobilization of the infundibulum.   At this point, 2 clips were placed on the patient side of the cystic duct, 1 toward the specimen side.  Two clips were then placed on the patient side of the cystic artery and 1 toward the specimen side, and both structures were divided.  The gallbladder was then elevated and removed from the gallbladder fossa using electrocautery with  excellent hemostasis of the liver bed.  The gallbladder was now placed into a retrieval sac and removed and sent to Pathology for further evaluation.  Careful inspection now revealed the previously placed clips on the cystic duct and cystic artery were well approximated without evidence of bile leak or bleeding.  Hemostasis in the liver bed was excellent.  There was no soilage or spillage of bile noted.  At this point, pneumoperitoneum was released after all instruments were withdrawn and the robot was undocked.  The patient was placed in neutral rotation and the fascia at the umbilicus was reapproximated using 0 Vicryl suture.  All wounds were cleansed and irrigated, injected with local anesthetic.  The incisions were reapproximated using running subcuticular 4-0 Monocryl and Dermabond was then used to seal all incisions.  The patient was now awakened from anesthesia and brought to the recovery room in stable condition. The patient tolerated the procedure without apparent complication.  Needle, sponge, and instrument counts were correct at the end of the procedure, and procedural findings were discussed with the patient's family immediately upon conclusion of surgery.           Marito Mcguire MD FACS  Trauma Medical Director, [facility]    The 21st Century Cures Act makes medical notes like these available to patients in the interest of transparency. Please be advised this is a medical document. Medical documents are intended to carry relevant information, facts as evident, and the clinical opinion of the practitioner. The medical note is intended as peer to peer communication and may appear blunt or direct. It is written in medical language and may contain abbreviations or verbiage that are unfamiliar.    This note was prepared using Dragon Medical voice recognition dictation software. As a result, errors may occur. When identified, these errors have been corrected. While every  attempt is made to correct errors during dictation, discrepancies may still exist.

## 2025-03-25 NOTE — DISCHARGE INSTRUCTIONS
Home Care Instructions  Cholecystectomy  Dr Marito Mcguire    MEDICATIONS  For post-operative pain control the medications are usually Oxycodone. This is a narcotic medication and is best taken by starting with one tablet and repeating every four to six hours as needed. If the patient does not feel they need the narcotics they shouldn’t take them. If the pain is severe the patient may take up to two pills every four hours. If a minor supplement is necessary in addition to the narcotics please supplement with Tylenol (acetaminophen), Advil (ibuprofen) or Motrin. Please ask your surgeon before resuming blood thinners such as aspirin, plavix or coumadin. All other home medications may be resumed as scheduled. With severe appendicitis antibiotics will also be prescribed. With antibiotics, please watch for rash, facial swelling or severe diarrhea.    DIET  The patient may resume a general diet immediately. This is not a good time to eat excessively. The patient should eat in moderation and stick with foods the patient feels are easy to digest. Avoid high fat foods in the immediate post-operative time period as this may still cause some problems. The patient may eat anything in small amounts but foods rich in dairy products, fatty foods or fried foods may cause an upset stomach for up to six weeks after surgery. There should be no alcohol consumption in the immediate recover time period or within six hours of taking narcotics.    WOUND CARE  Dermabond, a skin glue, is applied over the incisions. This will fall off on its own in the next several weeks. The patient may shower the day after surgery. There is no need to cover the incisions and all top gauze type dressing should be removed prior to showering. Soap can get on the wounds but do not scrub over the wounds. No hair dye or chemicals of any kind should get in the wounds. Avoid tub baths for two weeks. If visible sutures or staples are present they will be removed in  the office by the surgeon or nurse. Most wounds will be closed with dissolving suture underneath the skin. These sutures will dissolve on their own. If a drain is present make sure the patient receives drain care instructions from the nurse prior to discharge. Most patients will not have a drain.    ACTIVITY  Every day the patient should be up, showered and dressed. Each day the patient should be up and around the house. The patient should not lie in bed and should not stay in pajamas. We count on the patient being up, coughing, walking and deep breathing to avoid pneumonia and blood clots in the legs. Once a day the patient should get out of the house and go shopping, go to the mall, the [employer] store, the movies or a restaurant. The patient may ride in a car but should not drive the car for at least one week. Patients should be off narcotics for at least 8 hours prior to being a . The average time off work is 10 to 14 days; most adults will be seeing the surgeon prior to returning to work. Students will return to school within 1-5 days after discharge from the hospital but will be off gym, sports, and indoors for recess for one month. Patients may go up and down stairs and lift up to five pounds but no bending, pushing or pulling. Nothing called work or exercise until the follow up visit. No ‘stair-master’, power walking, jogging or workouts until the follow up visit. Patients should seek further activity limits at the time of their appointment.    APPOINTMENT  Please call our office for an appointment within five to ten days of discharge Any fever greater than 100.5, chills, nausea, vomiting, or severe diarrhea please call out office. If the wound turns red, hot, swollen, becomes increasingly painful, or drains pus call us immediately at [phone]. For life threatening emergencies call [phone]. For non-emergent care please call our office after 9:30 a.m. Monday through Saturday. The number listed above is  out office number, out phone automatically switches to out answering service if we are not there. Please do not use the emergency room for non-urgent care.      Thank you for entrusting me with your care.  [facility] General Surgery     ----------------------------------  [facility] GENERAL UROLOGY POST OPERATIVE INSTRUCTIONS    REPEAT BMP NEXT WEEK - ORDER PLACED THROUGH [facility]    The time after surgery is one that can be interesting, scary, and full of questions.  Here are some general items to help you navigate the post-operative period.  At any time, should you have any questions, don't hesitate to contact our office for additional assistance.    DIET:  Unless otherwise directed, resume your regular healthy diet.  Return to any medically-directed diets if necessary (renal diet, diabetic diet, etc.).  Drink plenty of fluids.  Most fluids are all right, including small amounts of alcoholic beverages if desired (but not recommended if you are taking prescription pain medication or other medications that you may not use with alcohol ingestion.)    ACTIVITY:  Avoid strenuous physical exercise, including heavy lifting/pushing/pulling (>20 lbs.) and sexual intercourse until released by your doctor.  This is generally a period of four to eight weeks for open or laparoscopic surgical procedures, or 1-2 weeks for outpatient procedures.  Driving is generally okay once pain free and off all prescription pain medications; of course, you may be a passenger for short rides.  Going out to the library, to supper and a movie, or other light activity is even encouraged if you feel well.  Extended travel is not recommended until you are released by your surgeon.  It is okay to go up and down stairs as long as you go slowly.  If you have any surgical clips or sutures, you may be allowed to take routine showers, but should not submerge your incisions in standing water (pool, tub, etc.) for 21 days.  Avoid rubbing or  scrubbing the incision(s).  Rather, allow soapy water to pass over the incisions and simply pat them dry.    VOIDING:  Many urology patients will be discharged with a catheter and will need additional instructions (see below); however, for patients without a catheter, please void whenever the urge presents itself.  Do not hold your urine.  You may be getting up in the middle of the night or urinating more frequently during the daytime after any urologic procedure.  This is normal after many types of surgeries, but a more normal urinary voiding pattern should resume within days, or rarely within a few weeks.  If you are unable to urinate altogether, please phone our nurse for further instructions, or seek attention in an immediate/urgent/or emergent setting.      BLOOD IN THE URINE:  You may have some blood in the urine for two to four weeks after some urologic procedures.  This is usually not serious and a normal part of healing from some urinary surgeries.  Should you have persistent blood, large clots, or the inability to void due to large clots, notify you urology team.    REST:  You should get plenty of rest after any procedure.  However, use your bed as you did prior to your surgery - to take a small nap, and to sleep in the evenings.  Do not lie around in bed all day as this can actually result in post anesthesia complications.  We encourage you to get out of bed daily, take multiple light walks, strolling outdoors if desired.  It is well known that patients that lie or sit all day have more wound complications, at times sever complications from blood clots in the legs, pneumonias, and other challenges.    CONSTIPATION:  Please work to avoid constipation.  We do not recommend enemas or most rectal suppositories after most urologic surgery.  Daily use of Colace or Docusate over-the-counter (100 mg three times daily with 8 oz water) is recommended for the month after surgery - especially if taking prescription  pain medication.  Daily prune juice and increased intake of fruits and vegetables are also helpful to prevent acute constipation.  Acute constipation may necessitate the use of over-the-counter Milk of Magnesia - 30 cc every evening until effect - if needed.    MEDICATIONS:  Unless otherwise directed, resume all of your prior home medications upon discharge.  The exception may be blood thinners (Coumadin, Warfarin, Plavix, Xeralto, etc.) which are typically held for one week prior to, and after, larger urologic surgeries.  Your surgeon will give the recommended times for these medications to be held so that you may work with the nurse or physician tem that manages your anticoagulation medication.  Should you be prescribed and antibiotic please take as directed until completed.    INCENTIVE SPIROMETER/DEEP BREATHING EXERCISES:  You may be provided with an incentive spirometer (IS) breathing exercise machine while in the hospital.  Your nursing staff will educate you on it's use.  This is a very important piece to post anesthesia pneumonia prevention, so take your IS home with you and continue regular use (10x/hour while awake) for the entire time you recover at home prior rot returning to work or regular activities.    FOLLOW UP CARE:  Please call our office at [phone] to coordinate follow up     NOTIFY OUR OFFICE OF:  - Temperature greater than 101 degrees.  - Accitentally pulling the string and your stent is pulled out.  - Any questions you think are important for your urology team.  - You have challenges with catheter management.    SEEK EMERGENCY CARE with sudden onset of shortness of breath, chest pain, increasing calf or leg pain, or acute condition that you feel needs emergent attention.

## 2025-03-25 NOTE — PLAN OF CARE
Patient is lethargic arriving PACU but otherwise oriented x4. ON room air. Abdomen is soft/ rounded. Lap sites x9 closed with skin glue,c/d/I. Garcia catheter intact draining yellow urine. Receiving IVF. Patient c/o nausea. Zofran and compazine given. Patient c/o severe abdominal pain. Dilaudid given. On clear liquid diet. PAUL drain x1 with serosanguinous output. 2 person skin assessment completed with Dianca, PCT. Lap sites noted, otherwise skin is c/d/I.

## 2025-03-25 NOTE — ANESTHESIA PROCEDURE NOTES
Airway  Date/Time: 3/25/2025 7:39 AM  Urgency: elective    Airway not difficult    General Information and Staff    Patient location during procedure: OR  Anesthesiologist: Jennifer Adams MD  Performed: anesthesiologist   Performed by: Jennifer Adams MD  Authorized by: Jennifer Adams MD      Indications and Patient Condition  Indications for airway management: anesthesia  Sedation level: deep  Preoxygenated: yes  Patient position: sniffing  Mask difficulty assessment: 1 - vent by mask    Final Airway Details  Final airway type: endotracheal airway      Successful airway: ETT  Cuffed: yes   Successful intubation technique: direct laryngoscopy  Endotracheal tube insertion site: oral  Blade: Olimpia  Blade size: #4  ETT size (mm): 7.5    Cormack-Lehane Classification: grade I - full view of glottis  Placement verified by: capnometry   Measured from: lips  ETT to lips (cm): 23  Number of attempts at approach: 1

## 2025-03-25 NOTE — TELEPHONE ENCOUNTER
Called patient Left message to call back. Please obtain details for Family Medical Leave Act.   Anesthesia Type: 1% lidocaine with epinephrine

## 2025-03-25 NOTE — OPERATIVE REPORT
[facility] Urology                Operative Note      Nolan Martínez Jr. Patient Status:  Outpatient in a Bed    10/20/2000 MRN [id number]   [facility] SURGERY Attending Pia Peña MD   Hosp Day # 0 PCP Quita Soni MD     Please note that Dr Mcguire completed a right cholecystectomy after our surgery - please refer to his op note for details.     DATE OF SURGERY: 3/25/2025  PRIMARY SURGEON: Pia Peña MD  FIRST ASSISTANT (bedside): Kushal Monzon    PREOPERATIVE DIAGNOSES:  Right 6 cm kidney mass.   POSTOPERATIVE DIAGNOSES: Right 6 cm kidney mass.    PROCEDURE PERFORMED:  Laparoscopic robotic-assisted right  partial nephrectomy.    COMPLICATIONS:  None.  WARM ISCHEMIA TIME: 25 min.   ESTIMATED BLOOD LOSS:  100 mL.  PATHOLOGICAL SPECIMEN: Right kidney mass.  DRAINS: 16-Monegasque Garcia catheter.    Please note that Kushal Monzon provided necessary first assistant services under my supervision throughout the robotic case.    INDICATIONS FOR PROCEDURE:  The patient was referred for evaluation of right 6 cm renal mass.   Different treatment options including observation, laparoscopic  versus open partial nephrectmoy, radical nephrectomy,  cryo vs radiofrequency ablation were reviewed with the patient in great detail.  Patient elected to undergo a laparoscopic robotic-assisted partial nephrectomy after reviewing all risks, benefits, possible complications, and personel involved.  A consent was obtained.    PROCEDURE:  The patient was brought back to the operating room.  Patient was given intravenous antibiotics preoperatively.  Bilateral EPC cuffs were placed on the lower extremities.  General anesthesia was induced and the patient was then positioned in a left decubitus position with proper padding.  A 16 Fr Garcia was inserted.  The patient was prepped and draped in the sterile fashion.      Through a periumbilical incision, a Veress needle was inserted into the peritoneum  and pneumoperitoneum was obtained without complications.  A 8-mm port was then placed through the same incision and a camera was introduced.  No organ injury was observed.  The remaining ports were placed in standard fashion under direct vision including an 8-mm trocar subcostally, an 8 mm trocar in left lower abdomen,  and another 12-mm just inferior to the first camera port. The robotic arms were then attached.    We mobilized the right colon and duodenum medially, exposing the inferior vena cava; the liver was retracted cephalad with a self retractor.  The right renal vein was visualized and carefully dissected bluntly and sharply until we were sure we could placed a vascular bull dog clamp around it.  Immediately behind the vein we found the right renal artery and proceeded with further blunt and sharp dissection until it was completely exposed.  At this time, the right gonadal vein and the right ureter were identified and followed cephalad to the renal hilum.  The space between these 2 structures and psoas muscle was visualized.  Blunt dissection was carried up to renal hilum lifting gently kidney cephalad, once again exposing both renal vessels in preparation for clamping.  The Gerotas fascia was noted to be thick and the kidney was completely defatted at this time in its anterior, posterior, and laterl plane.  This helped us identify the borders of the tumor/mass, which occupied the entire lower pole and was exophytic in nature.  An intraop sterile laparoscopic ultrasound probe was not necessary.     Two bulldog clamps were applied to the right renal artery and the vein was left open.  Total warm ischemia time was 25  min.  The mass was completely excised and sent to pathology.  We were able to close the kidney defect and calyx entry with 3-0 V-lock and 0-Vicryl sutures.  There was good reperfusion of the kidney and no bleeding was noted from the surgical site.  Surgicel pieces were then applied in the bed of  the incision area.  We subsequently closed Gerotas fascia using Weck clips.     The 12-mm assist port was then extended and the tumor was removed via an Endocatch bag.  Fascia was closed with 0-Vycril interrupted sutures.   The skin incisions were approximated using 4-0 Monocryl subcuticular stitches.  There were no complications during the surgery.    I was present throughout the entirety of this procedure.      DISPOSITION:  The patient was transferred to PACU in stable condition and he was extubated without difficulty.  The patient will be admitted to hospital.    Pia Peña MD  [facility] Urology  3/25/2025

## 2025-03-25 NOTE — ANESTHESIA POSTPROCEDURE EVALUATION
[facility] Mart[facility]nez [facility] Patient Status:  Outpatient in a Bed   Age/Gender 24 year old male MRN [id number]   Location [facility] SURGERY Attending Pia Peña MD   Hosp Day # 0 PCP Quita Soni MD       Anesthesia Post-op Note    XI ROBOT-ASSISTED LAPAROSCOPIC RIGHT PARTIAL NEPHRECTOMY (MIOCINRENEE)    Procedure Summary       Date: 03/25/25 Room / Location:  MAIN OR 08 /  MAIN OR    Anesthesia Start: 0731 Anesthesia Stop: 1257    Procedures:       XI ROBOT-ASSISTED LAPAROSCOPIC RIGHT PARTIAL NEPHRECTOMY (MIOCINOVIC) (Right: Abdomen)      XI Robot-Assisted LAPAROSCOPIC CHOLECYSTECTOMY WITH INDOCYANINE GREEN (BYRON) (Abdomen) Diagnosis: (RIGHT KIDNEY MASS)    Surgeons: Pia Peña MD; Marito Mcguire MD Anesthesiologist: Jennifer Adams MD    Anesthesia Type: general ASA Status: 2            Anesthesia Type: general    Vitals Value Taken Time   /95 03/25/25 1427   Temp 98.2 °F (36.8 °C) 03/25/25 1403   Pulse 108 03/25/25 1429   Resp 23 03/25/25 1429   SpO2 93 % 03/25/25 1429   Vitals shown include unfiled device data.        Patient Location: PACU    Anesthesia Type: general    Airway Patency: patent and extubated    Postop Pain Control: adequate    Mental Status: preanesthetic baseline    Nausea/Vomiting: none    Cardiopulmonary/Hydration status: stable euvolemic    Complications: no apparent anesthesia related complications    Postop vital signs: stable    Dental Exam: Unchanged from Preop    Patient to be discharged from PACU when criteria met.

## 2025-03-25 NOTE — CONSULTS
[place] HOSPITALIST  CONSULT     Nolan Martínez Jr. Patient Status:  Outpatient in a Bed    10/20/2000 MRN [id number]   Location [facility] 3NW-A Attending Pia Peña MD   Hosp Day # 0 PCP Quita Soni MD     Reason for consult: medical management    Requested by: Pia Peña MD     History of Present Illness: Nolan Martínez Jr. is a 24 year old male with pmhx significant for chronic cholecystitis, R renal mass who was admitted for elective cholecystectomy and R partial nephrectomy. Pt underwent robot-assisted laparoscopic R partial nephrectomy as well as robotic assisted cholecystectomy with ICG cholangiogram procedure earlier today with no complications. Currently, denies any chest pain/pressure, SOB, n/v/d. Able to tolerate PO and denies any difficulty swallowing. Has expected post-op abdominal tenderness that is currently rated 6-7 out of 10 in pain, but otherwise, no complaints.     Past Medical History:  Past Medical History:    Cancer (HCC)    right kidney    Constipation    Right kidney mass    Visual impairment    glasses        Past Surgical History: History reviewed. No pertinent surgical history.    Social History:  reports that he has never smoked. He has never used smokeless tobacco. He reports current alcohol use. He reports that he does not use drugs.    Family History: History reviewed. No pertinent family history.     Allergies: Allergies[1]    Medications:  Medications Ordered Prior to Encounter[2]    Review of Systems:   A comprehensive 14 point review of systems was completed.    Pertinent positives and negatives noted in the HPI.    Physical Exam:    /86 (BP Location: Left arm)   Pulse 111   Temp 98.1 °F (36.7 °C) (Oral)   Resp 18   Ht 5' 10\" (1.778 m)   Wt 222 lb (100.7 kg)   SpO2 94%   BMI 31.85 kg/m²   General: No acute distress. Alert and oriented x 3.  Respiratory: Clear to auscultation bilaterally. No wheezes. No rhonchi.  Cardiovascular: S1, S2.  Regular rate and rhythm. No murmurs, rubs or gallops. Equal pulses.   Abdomen: Soft, nontender, nondistended.  Positive bowel sounds. No rebound, guarding or organomegaly. Lap surgical incision c/d/I abdominal binder in place  Neurologic: No focal neurological deficits. CNII-XII grossly intact.  Musculoskeletal: Moves all extremities.  Extremities: No edema or cyanosis.      Diagnostic Data:      Labs:  No results for input(s): \"WBC\", \"HGB\", \"MCV\", \"PLT\", \"BAND\", \"INR\" in the last 168 hours.    Invalid input(s): \"LYM#\", \"MONO#\", \"BASOS#\", \"EOSIN#\"    No results for input(s): \"GLU\", \"BUN\", \"CREATSERUM\", \"GFRAA\", \"GFRNAA\", \"CA\", \"ALB\", \"NA\", \"K\", \"CL\", \"CO2\", \"ALKPHO\", \"AST\", \"ALT\", \"BILT\", \"TP\" in the last 168 hours.    No results for input(s): \"PTP\", \"INR\" in the last 168 hours.    COVID-19 Lab Results    COVID-19  Lab Results   Component Value Date    COVID19 Not Detected 01/20/2025    COVID19 Not Detected 01/22/2024    COVID19 Not Detected 01/22/2024       Pro-Calcitonin  No results for input(s): \"PCT\" in the last 168 hours.    Cardiac  No results for input(s): \"TROP\", \"PBNP\" in the last 168 hours.    Creatinine Kinase  No results for input(s): \"CK\" in the last 168 hours.    Inflammatory Markers  No results for input(s): \"CRP\", \"ANDERW\", \"LDH\", \"DDIMER\" in the last 168 hours.    Imaging: Imaging data reviewed in Epic.      ASSESSMENT / PLAN:     #Chronic cholecystitis  #R renal mass, suspicious for malignancy   -s/p robot-assisted laparoscopic R partial nephrectomy as well as robotic assisted cholecystectomy with ICG cholangiogram on 3/25  -pathology pending  -urology primary  -Pain control, anti-emetics PRN, bowel regimen   -DVT ppx per primary service  -general surgery following    #Obesity  -Body mass index is 31.85 kg/m².    #Elevated BP without prior diagnosis of HTN  -monitor      Quality:  DVT Prophylaxis: lovenox, per primary service  CODE status: FULL  Garcia: yes     Plan of care discussed with pt,  JUAN Kelley DO  3/25/2025               [1]   Allergies  Allergen Reactions    Cherry Coughing   [2]   Current Facility-Administered Medications on File Prior to Encounter   Medication Dose Route Frequency Provider Last Rate Last Admin    [COMPLETED] iopamidol 76% (ISOVUE-370) injection for power injector  100 mL Intravenous ONCE PRN Quita Soni MD   100 mL at 02/14/25 6593     No current outpatient medications on file prior to encounter.

## 2025-03-25 NOTE — INTERVAL H&P NOTE
Pre-op Diagnosis: RIGHT KIDNEY MASS    The above referenced H&P was reviewed by Pia Peña MD on 3/25/2025, the patient was examined and no significant changes have occurred in the patient's condition since the H&P was performed.  I discussed with the patient and/or legal representative the potential benefits, risks and side effects of this procedure; the likelihood of the patient achieving goals; and potential problems that might occur during recuperation.  I discussed reasonable alternatives to the procedure, including risks, benefits and side effects related to the alternatives and risks related to not receiving this procedure.  We will proceed with procedure as planned.    DARRYN Peña MD   3/25/25

## 2025-03-25 NOTE — ANESTHESIA PROCEDURE NOTES
Peripheral IV  Date/Time: 3/25/2025 7:50 AM  Inserted by: Jennifer Adams MD    Placement  Needle size: 18 G  Laterality: right  Location: forearm  Local anesthetic: none  Site prep: alcohol  Technique: anatomical landmarks  Attempts: 1

## 2025-03-25 NOTE — BRIEF OP NOTE
Pre-Operative Diagnosis: Chronic cholecystitis    Post-Operative Diagnosis: Same     Procedure Performed:   XI ROBOT-ASSISTED LAPAROSCOPIC RIGHT PARTIAL NEPHRECTOMY (FELIX)      Robotic assisted cholecystectomy with ICG cholangiogram ([place])    Surgeons and Role:  Panel 1:     * Pia Peña MD - Primary  Panel 2:     * Marito Mcguire MD - Primary    Assistant(s):  Surgical Assistant.: Kushal Monzon, YOMI  PA: Dorothy Coates PA-C     Surgical Findings: Chronic gallbladder inflammation.  Normal anatomy via ICG imaging     Specimen: Gallbladder     Estimated Blood Loss: Blood Output: 5 mL (3/25/2025 12:31 PM)      Dictation Number:      Marito Mcguire MD  3/25/2025  12:33 PM

## 2025-03-26 ENCOUNTER — APPOINTMENT (OUTPATIENT)
Dept: ULTRASOUND IMAGING | Facility: HOSPITAL | Age: 25
End: 2025-03-26
Attending: INTERNAL MEDICINE
Payer: COMMERCIAL

## 2025-03-26 VITALS
SYSTOLIC BLOOD PRESSURE: 140 MMHG | OXYGEN SATURATION: 95 % | HEART RATE: 98 BPM | HEIGHT: 70 IN | DIASTOLIC BLOOD PRESSURE: 96 MMHG | RESPIRATION RATE: 22 BRPM | BODY MASS INDEX: 31.78 KG/M2 | TEMPERATURE: 98 F | WEIGHT: 222 LBS

## 2025-03-26 LAB
ANION GAP SERPL CALC-SCNC: 10 MMOL/L (ref 0–18)
BASOPHILS # BLD AUTO: 0.01 X10(3) UL (ref 0–0.2)
BASOPHILS NFR BLD AUTO: 0.1 %
BUN BLD-MCNC: 14 MG/DL (ref 9–23)
CALCIUM BLD-MCNC: 8.8 MG/DL (ref 8.7–10.6)
CHLORIDE SERPL-SCNC: 108 MMOL/L (ref 98–112)
CO2 SERPL-SCNC: 24 MMOL/L (ref 21–32)
CREAT BLD-MCNC: 1.43 MG/DL
EGFRCR SERPLBLD CKD-EPI 2021: 69 ML/MIN/1.73M2 (ref 60–?)
EOSINOPHIL # BLD AUTO: 0.02 X10(3) UL (ref 0–0.7)
EOSINOPHIL NFR BLD AUTO: 0.2 %
ERYTHROCYTE [DISTWIDTH] IN BLOOD BY AUTOMATED COUNT: 13.2 %
GLUCOSE BLD-MCNC: 105 MG/DL (ref 70–99)
HCT VFR BLD AUTO: 39.6 %
HGB BLD-MCNC: 12.9 G/DL
IMM GRANULOCYTES # BLD AUTO: 0.04 X10(3) UL (ref 0–1)
IMM GRANULOCYTES NFR BLD: 0.3 %
LYMPHOCYTES # BLD AUTO: 1.51 X10(3) UL (ref 1–4)
LYMPHOCYTES NFR BLD AUTO: 12.2 %
MCH RBC QN AUTO: 30 PG (ref 26–34)
MCHC RBC AUTO-ENTMCNC: 32.6 G/DL (ref 31–37)
MCV RBC AUTO: 92.1 FL
MONOCYTES # BLD AUTO: 1.78 X10(3) UL (ref 0.1–1)
MONOCYTES NFR BLD AUTO: 14.3 %
NEUTROPHILS # BLD AUTO: 9.06 X10 (3) UL (ref 1.5–7.7)
NEUTROPHILS # BLD AUTO: 9.06 X10(3) UL (ref 1.5–7.7)
NEUTROPHILS NFR BLD AUTO: 72.9 %
OSMOLALITY SERPL CALC.SUM OF ELEC: 295 MOSM/KG (ref 275–295)
PLATELET # BLD AUTO: 317 10(3)UL (ref 150–450)
POTASSIUM SERPL-SCNC: 3.6 MMOL/L (ref 3.5–5.1)
RBC # BLD AUTO: 4.3 X10(6)UL
SODIUM SERPL-SCNC: 142 MMOL/L (ref 136–145)
WBC # BLD AUTO: 12.4 X10(3) UL (ref 4–11)

## 2025-03-26 PROCEDURE — 99214 OFFICE O/P EST MOD 30 MIN: CPT | Performed by: INTERNAL MEDICINE

## 2025-03-26 PROCEDURE — 93971 EXTREMITY STUDY: CPT | Performed by: INTERNAL MEDICINE

## 2025-03-26 RX ORDER — PSEUDOEPHEDRINE HCL 30 MG
100 TABLET ORAL 2 TIMES DAILY
Qty: 60 CAPSULE | Refills: 0 | Status: SHIPPED | OUTPATIENT
Start: 2025-03-26

## 2025-03-26 RX ORDER — METOPROLOL TARTRATE 25 MG/1
12.5 TABLET, FILM COATED ORAL 2 TIMES DAILY
Qty: 30 TABLET | Refills: 0 | Status: SHIPPED | OUTPATIENT
Start: 2025-03-26

## 2025-03-26 RX ORDER — OXYCODONE HYDROCHLORIDE 10 MG/1
10 TABLET ORAL EVERY 4 HOURS PRN
Qty: 20 TABLET | Refills: 0 | Status: SHIPPED | OUTPATIENT
Start: 2025-03-26

## 2025-03-26 RX ORDER — ONDANSETRON 4 MG/1
4 TABLET, FILM COATED ORAL EVERY 8 HOURS PRN
Qty: 20 TABLET | Refills: 0 | Status: SHIPPED | OUTPATIENT
Start: 2025-03-26

## 2025-03-26 NOTE — PLAN OF CARE
A&Ox4. VSS. RA. . Denies chest pain and SOB.   GI: Abdomen soft, nondistended. Denies Passing gas, Belching  Denies nausea.   : Folwy in place (draining clear yellow urine  Pain controlled with PRN pain medications.   Up with standby assist.   Incisions: 9 lap sites with skin glue (C/D?I), abdominal binder in place  Diet: Clears  IVF running per order.  IV abx given   All appropriate safety measures in place. All questions and concerns addressed.

## 2025-03-26 NOTE — PROGRESS NOTES
[facility]  Urology Progress Note    Nolan Martínez Jr. Patient Status:  Outpatient in a Bed    10/20/2000 MRN [id number]   Location [facility] 3NW-A Attending Pia Peña MD   Hosp Day # 0 PCP Quita Soni MD     Subjective:  Nolan Martínez Jr. is a(n) 24 year old male.    S/P laparoscopic robotic-assisted right partial nephrectomy (Dr. Peña) 3/25/25  S/P Xi robotic cholecystectomy with ICG imaging (Dr. Mcguire) 3/25/25    Current complaints: +abdominal pain.  Nausea earlier - anti-nausea medication helped.  No vomiting, fever, or chills.  No CP, SOB.  Tolerating clears. No flatus.  Has not ambulated    Right hand swelling    Objective:  General appearance: alert, appears stated age, and cooperative  Blood pressure (!) 152/96, pulse 114, temperature 98.4 °F (36.9 °C), temperature source Oral, resp. rate 18, height 5' 10\" (1.778 m), weight 222 lb (100.7 kg), SpO2 94%.  Lungs: non-labored respirations  Abdomen: soft, mildly distended with expected incisional tenderness.  Incisions C/D/I.  Abdominal binder in place  : vallecillo catheter in place draining yellow urine (UOP - 1275 mL total yesterday)       Assessment:   RIGHT RENAL MASS   S/P laparoscopic robotic-assisted right partial nephrectomy (Dr. Peña) 3/25/25    CHRONIC CHOLECYSTITIS  S/P Xi robotic cholecystectomy with ICG imaging (Dr. Mcguire) 3/25/25    Afebrile  AM labs pending  Pathology pending    Plan:    Encouraged ambulation and use of IS x 10/hr  HL IV  Advance diet slowly  Bowel regimen  Pain management  Vallecillo catheter may be removed  Follow labs, temp  Hospitalist following    Above discussed with patient, family, nurse  Will update Dr. Peña;.    Genevieve Stone PA-C  [facility]  Department of Urology  3/26/2025  5:54 AM    Addendum:  Recent Labs   Lab 25  0548   RBC 4.30   HGB 12.9*   HCT 39.6   MCV 92.1   MCH 30.0   MCHC 32.6   RDW 13.2   NEPRELIM 9.06*   WBC 12.4*   .0       Recent  Labs   Lab 03/26/25  0548   *   BUN 14   CREATSERUM 1.43*   EGFRCR 69   CA 8.8      K 3.6      CO2 24.0     Spoke with hospitalist - US of UE to be done. Nurse and patient notified.    Lab results reviewed with Dr. Peña - repeat BMP next week if patient ends up being DC today.      Above discussed with nurse, Dr. Peña.    HAO Carranza-VICKI  Urology

## 2025-03-26 NOTE — TELEPHONE ENCOUNTER
Patient called back, gathered the below;    Type of Leave:  Cont. Family Medical Leave Act   Reason for Leave:Surgery on 03/25/2025  Start date of leave:03/25/2025  End date of leave: 04/14/2025  How many flare ups per month/length?:  How many appts per month/length?:   Was Fee and Turnaround info Given?:      Provided details to processing agent

## 2025-03-26 NOTE — PROGRESS NOTES
[facility]   part of [facility]     Hospitalist Progress Note     Nolan Martínez Jr. Patient Status:  Outpatient in a Bed    10/20/2000 MRN [id number]   Location [facility] 3NW-A Attending Pia Peña MD   Hosp Day # 0 PCP Quita Soni MD     Chief Complaint: med management    Subjective:     Patient without acute events overnight. Noted R arm swelling today. No F/C. Pain controlled.     Objective:    Review of Systems:   A comprehensive review of systems was completed; pertinent positive and negatives stated in subjective.    Vital signs:  Temp:  [97.1 °F (36.2 °C)-98.5 °F (36.9 °C)] 98.5 °F (36.9 °C)  Pulse:  [] 105  Resp:  [16-31] 20  BP: (127-160)/(83-97) 154/97  SpO2:  [92 %-98 %] 94 %    Physical Exam:    General: No acute distress  Respiratory: No wheezes, no rhonchi  Cardiovascular: S1, S2, regular rate and rhythm  Abdomen: Soft, Non-tender, non-distended, positive bowel sounds  Neuro: No new focal deficits.   Extremities: No edema      Diagnostic Data:    Labs:  Recent Labs   Lab 25  0548   WBC 12.4*   HGB 12.9*   MCV 92.1   .0       Recent Labs   Lab 25  0548   *   BUN 14   CREATSERUM 1.43*   CA 8.8      K 3.6      CO2 24.0       Estimated Glomerular Filtration Rate: 69 mL/min/1.73m2 (result from lab).    No results for input(s): \"TROP\", \"TROPHS\", \"CK\" in the last 168 hours.    No results for input(s): \"PTP\", \"INR\" in the last 168 hours.               Microbiology    No results found for this visit on 25.      Imaging: Reviewed in Epic.    Medications:    sennosides  17.2 mg Oral Nightly    docusate sodium  100 mg Oral BID    enoxaparin  40 mg Subcutaneous Daily    acetaminophen  650 mg Oral Q4H While awake    famotidine  20 mg Oral BID    Or    famotidine  20 mg Intravenous BID       Assessment & Plan:      #Chronic cholecystitis  #R renal mass, suspicious for malignancy   -s/p robot-assisted laparoscopic R partial  nephrectomy as well as robotic assisted cholecystectomy with ICG cholangiogram on 3/25  -urology primary  -Pain control, anti-emetics PRN, bowel regimen   -DVT ppx per primary service  -general surgery following    #R ARm swelling  Check US     #Obesity  -Body mass index is 31.85 kg/m².     #Elevated BP without prior diagnosis of HTN  -monitor         Viraj Dodge MD    Supplementary Documentation:     Quality:  DVT Mechanical Prophylaxis:   SCDs,    DVT Pharmacologic Prophylaxis   Medication    enoxaparin (Lovenox) 40 MG/0.4ML SUBQ injection 40 mg                Code Status: Not on file  Garcia: No urinary catheter in place  Garcia Duration (in days):   Central line:    RACHEL: 3/26/2025    Discharge is dependent on: progress  At this point Mr. Martínez is expected to be discharge to: home    The 21st Century Cures Act makes medical notes like these available to patients in the interest of transparency. Please be advised this is a medical document. Medical documents are intended to carry relevant information, facts as evident, and the clinical opinion of the practitioner. The medical note is intended as peer to peer communication and may appear blunt or direct. It is written in medical language and may contain abbreviations or verbiage that are unfamiliar.

## 2025-03-26 NOTE — PLAN OF CARE
Pt here from: Home   Neuro: A&Ox4, VSS, RA, , IS. Denies cough, chest pain, and SOB.   GI: Abdomen soft, passing gas and belching. Denies nausea.   : Voids freely in urinal in bathroom.   Pain controlled with meds per MAR.   Up ad loli.   Incisions: Laps x 9 with skin glue are CDI. Abd binder on.   Diet: Tolerating soft diet.   IV saline locked per order.    All appropriate safety measures in place. All questions and concerns addressed. Bed locked and in lowest position. Call light in reach. Pt should DC after US doppler of R arm is completed.     Patient is alert and oriented x4. ABD incisions are CDI. Abd binder in place. Denies nausea or vomiting. VSS, tolerating diet, voiding adequately, pain controlled, tolerating ambulating. Discharge education provided. Reviewed medications and follow up appts. All questions answered and concerns addressed, pt verbalized understanding. Pt DC in stable condition. PIV removed and intact. Pt assessed and ready for discharge. Family at bedside for DC teaching. Pt all set to DC once Meds to Beds comes up. Ok to write work note for pts mom per Genevieve Stone.

## 2025-03-28 ENCOUNTER — TELEPHONE (OUTPATIENT)
Dept: FAMILY MEDICINE CLINIC | Facility: CLINIC | Age: 25
End: 2025-03-28

## 2025-03-28 NOTE — TELEPHONE ENCOUNTER
Dr. cMguire/ Dorothy,    Please sign off on form if you agree to: Family Medical Leave Act due to XI Robot-Assisted LAPAROSCOPIC CHOLECYSTECTOMY WITH INDOCYANINE GREEN. Start date 03/25/25-04/14/25    -Signature page will be the first page scanned  -From your Inbasket, Highlight the patient and click Chart   -Double click the 03/11/2025 Forms Completion telephone encounter  -Scroll down to the Media section   -Click the blue Hyperlink: Family Medical Leave Act Dr Mcguire/ SEVERIANO Coates 03/28/25  -Click Acknowledge located in the top right ribbon/menu   -Drag the mouse into the blank space of the document and a + sign will appear. Left click to   electronically sign the document.  -Once signed, simply exit out of the screen and you signature will be saved.     Thank you,  Soumya

## 2025-03-28 NOTE — TELEPHONE ENCOUNTER
Received a page, spoke with patient via phone. Today took a full pill of metoprolol when the rx was written for one half pill. Last dose was this morning. Advised to check BP at home, get a BP cuff. Goal is around 120/80. 140/90 is too high. If the top number is less than 100 that is too low. Check blood pressure if feeling off such as dizzy, headache, nausea, other change in status. Patient asked for hospital follow up appointment. I was able to book patient for Monday at 11:30am. Advised to call if questions or concerns arise. Patient verbalized understanding.

## 2025-03-31 ENCOUNTER — TELEPHONE (OUTPATIENT)
Dept: FAMILY MEDICINE CLINIC | Facility: CLINIC | Age: 25
End: 2025-03-31

## 2025-03-31 ENCOUNTER — HOSPITAL ENCOUNTER (OUTPATIENT)
Dept: ULTRASOUND IMAGING | Age: 25
Discharge: HOME | End: 2025-03-31
Attending: STUDENT IN AN ORGANIZED HEALTH CARE EDUCATION/TRAINING PROGRAM
Payer: COMMERCIAL

## 2025-03-31 ENCOUNTER — OFFICE VISIT (OUTPATIENT)
Dept: FAMILY MEDICINE CLINIC | Facility: CLINIC | Age: 25
End: 2025-03-31
Payer: COMMERCIAL

## 2025-03-31 ENCOUNTER — LAB ENCOUNTER (OUTPATIENT)
Dept: LAB | Age: 25
End: 2025-03-31
Attending: PHYSICIAN ASSISTANT
Payer: COMMERCIAL

## 2025-03-31 VITALS
HEIGHT: 70 IN | DIASTOLIC BLOOD PRESSURE: 60 MMHG | HEART RATE: 86 BPM | BODY MASS INDEX: 31.07 KG/M2 | TEMPERATURE: 97 F | RESPIRATION RATE: 18 BRPM | SYSTOLIC BLOOD PRESSURE: 110 MMHG | WEIGHT: 217 LBS

## 2025-03-31 DIAGNOSIS — R05.3 CHRONIC COUGH: ICD-10-CM

## 2025-03-31 DIAGNOSIS — R07.89 ATYPICAL CHEST PAIN: ICD-10-CM

## 2025-03-31 DIAGNOSIS — M79.604 RIGHT LEG PAIN: ICD-10-CM

## 2025-03-31 DIAGNOSIS — Z08 ENCOUNTER FOR POSTOPERATIVE EXAMINATION AFTER SURGERY FOR MALIGNANT NEOPLASM: ICD-10-CM

## 2025-03-31 DIAGNOSIS — Z90.5 HISTORY OF RIGHT NEPHRECTOMY: ICD-10-CM

## 2025-03-31 DIAGNOSIS — Z09 HOSPITAL DISCHARGE FOLLOW-UP: Primary | ICD-10-CM

## 2025-03-31 DIAGNOSIS — Z90.49 S/P CHOLECYSTECTOMY: ICD-10-CM

## 2025-03-31 DIAGNOSIS — N28.89 RIGHT RENAL MASS: ICD-10-CM

## 2025-03-31 DIAGNOSIS — R07.89 CHEST WALL PAIN: ICD-10-CM

## 2025-03-31 DIAGNOSIS — Z09 POSTOPERATIVE EXAMINATION: ICD-10-CM

## 2025-03-31 DIAGNOSIS — R74.8 ELEVATED LIVER ENZYMES: Primary | ICD-10-CM

## 2025-03-31 DIAGNOSIS — Z09 HOSPITAL DISCHARGE FOLLOW-UP: ICD-10-CM

## 2025-03-31 DIAGNOSIS — R03.0 ELEVATED BLOOD PRESSURE READING: ICD-10-CM

## 2025-03-31 DIAGNOSIS — C64.1 RENAL CELL CARCINOMA OF RIGHT KIDNEY (HCC): ICD-10-CM

## 2025-03-31 LAB
ALBUMIN SERPL-MCNC: 5.2 G/DL (ref 3.2–4.8)
ALP LIVER SERPL-CCNC: 66 U/L
ALT SERPL-CCNC: 56 U/L
ANION GAP SERPL CALC-SCNC: 10 MMOL/L (ref 0–18)
AST SERPL-CCNC: 37 U/L (ref ?–34)
ATRIAL RATE: 77 BPM
BILIRUB DIRECT SERPL-MCNC: <0.1 MG/DL (ref ?–0.3)
BILIRUB SERPL-MCNC: 0.4 MG/DL (ref 0.3–1.2)
BUN BLD-MCNC: 21 MG/DL (ref 9–23)
CALCIUM BLD-MCNC: 10.1 MG/DL (ref 8.7–10.6)
CHLORIDE SERPL-SCNC: 105 MMOL/L (ref 98–112)
CO2 SERPL-SCNC: 28 MMOL/L (ref 21–32)
CREAT BLD-MCNC: 1.39 MG/DL
EGFRCR SERPLBLD CKD-EPI 2021: 72 ML/MIN/1.73M2 (ref 60–?)
FASTING STATUS PATIENT QL REPORTED: YES
GLUCOSE BLD-MCNC: 87 MG/DL (ref 70–99)
OSMOLALITY SERPL CALC.SUM OF ELEC: 298 MOSM/KG (ref 275–295)
P AXIS: 32 DEGREES
P-R INTERVAL: 142 MS
POTASSIUM SERPL-SCNC: 4.2 MMOL/L (ref 3.5–5.1)
PROT SERPL-MCNC: 8.2 G/DL (ref 5.7–8.2)
Q-T INTERVAL: 362 MS
QRS DURATION: 90 MS
QTC CALCULATION (BEZET): 409 MS
R AXIS: 41 DEGREES
SODIUM SERPL-SCNC: 143 MMOL/L (ref 136–145)
T AXIS: 29 DEGREES
VENTRICULAR RATE: 77 BPM

## 2025-03-31 PROCEDURE — 3078F DIAST BP <80 MM HG: CPT | Performed by: STUDENT IN AN ORGANIZED HEALTH CARE EDUCATION/TRAINING PROGRAM

## 2025-03-31 PROCEDURE — 93000 ELECTROCARDIOGRAM COMPLETE: CPT | Performed by: STUDENT IN AN ORGANIZED HEALTH CARE EDUCATION/TRAINING PROGRAM

## 2025-03-31 PROCEDURE — 99214 OFFICE O/P EST MOD 30 MIN: CPT | Performed by: STUDENT IN AN ORGANIZED HEALTH CARE EDUCATION/TRAINING PROGRAM

## 2025-03-31 PROCEDURE — 93970 EXTREMITY STUDY: CPT | Performed by: STUDENT IN AN ORGANIZED HEALTH CARE EDUCATION/TRAINING PROGRAM

## 2025-03-31 PROCEDURE — 3008F BODY MASS INDEX DOCD: CPT | Performed by: STUDENT IN AN ORGANIZED HEALTH CARE EDUCATION/TRAINING PROGRAM

## 2025-03-31 PROCEDURE — 3074F SYST BP LT 130 MM HG: CPT | Performed by: STUDENT IN AN ORGANIZED HEALTH CARE EDUCATION/TRAINING PROGRAM

## 2025-03-31 PROCEDURE — 80076 HEPATIC FUNCTION PANEL: CPT | Performed by: STUDENT IN AN ORGANIZED HEALTH CARE EDUCATION/TRAINING PROGRAM

## 2025-03-31 RX ORDER — OMEPRAZOLE 40 MG/1
40 CAPSULE, DELAYED RELEASE ORAL DAILY
Qty: 30 CAPSULE | Refills: 2 | Status: SHIPPED | OUTPATIENT
Start: 2025-03-31

## 2025-03-31 NOTE — ASSESSMENT & PLAN NOTE
Keep upcoming appointment with genetics given patient has family history in his father of stage IV kidney cancer, unknown type.

## 2025-03-31 NOTE — TELEPHONE ENCOUNTER
Glad to hear this. Please let patient know. Suspect musculoskeletal strain after surgery. Stay active as tolerated, gentle stretching. Stay hydrated. Thank you.

## 2025-03-31 NOTE — TELEPHONE ENCOUNTER
Family Medical Leave Act Signed by Dr Mcguire and E-faxed to [employer] [facility] [phone], awaiting confirmation.

## 2025-03-31 NOTE — PROGRESS NOTES
[facility] Family Medicine Note  03/31/25    Chief Complaint   Patient presents with    Post-Op   Patient's partner, Belle, is here and provides part of the history.   HPI:   Nolan Martínez Jr. is a 24 year old male who presents for hospital follow up.    The patient presents for a recheck after hospitalization for a diagnosis of right kidney mass and biliary colic, patient is POD #6 s/p cholecystotomy and partial right nephrectomy.  Was in the hospital for 1 day postoperatively and was discharged on 3/26/25.    Feeling tired. Eating fine. Urinating normally, normal bowel movement.    No fevers.     Sometimes left sided pain, later in the evening when sitting. Lasts only a few minutes then goes away. Some mild cough at times, started before the surgery. Nonproductive cough.    Taking tylenol for pain.     At home blood pressure 113/75. Taking metoprolol 12.5mg twice daily. No dizziness.    Still taking stool softener. Has not needed to take zofran.    No rashes.  Scars itching a bit. No drainage.    To see genetics in May 2025. Patient's father had stage 4 kidney cancer, unsure of which type.    Has right leg pain behind the knee. Hurts when walking or getting up. No bruise visible.    Path 3/25/25:  Final Diagnosis:   A. 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  -Surgical margins are negative.     B. Gallbladder, cholecystectomy:  -Cholesterolosis.        Wt Readings from Last 6 Encounters:   03/31/25 217 lb (98.4 kg)   03/25/25 222 lb (100.7 kg)   03/20/25 224 lb (101.6 kg)   01/29/25 232 lb (105.2 kg)   01/20/25 228 lb (103.4 kg)   02/26/24 220 lb (99.8 kg)       Past Medical History:    Cancer (HCC)    right kidney    Constipation    Right kidney mass    Visual impairment    glasses     Past Surgical History:   Procedure Laterality Date    Laparoscopic cholecystectomy  03/25/2025     Allergies[1]   docusate sodium 100 MG Oral Cap  Take 100 mg by mouth 2 (two) times daily. 60 capsule 0    ondansetron (ZOFRAN) 4 mg tablet Take 1 tablet (4 mg total) by mouth every 8 (eight) hours as needed for Nausea. 20 tablet 0    metoprolol tartrate 25 MG Oral Tab Take 0.5 tablets (12.5 mg total) by mouth 2 (two) times daily. 30 tablet 0     Social History     Socioeconomic History    Marital status:    Tobacco Use    Smoking status: Never    Smokeless tobacco: Never   Vaping Use    Vaping status: Never Used   Substance and Sexual Activity    Alcohol use: Yes     Comment: 1 drink a year    Drug use: Never   Other Topics Concern    Caffeine Concern Yes    Exercise Yes    Seat Belt Yes     Social Drivers of Health     Food Insecurity: No Food Insecurity (3/25/2025)    NCSS - Food Insecurity     Worried About Running Out of Food in the Last Year: No     Ran Out of Food in the Last Year: No   Transportation Needs: No Transportation Needs (3/25/2025)    NCSS - Transportation     Lack of Transportation: No   Housing Stability: Not At Risk (3/25/2025)    NCSS - Housing/Utilities     Has Housing: Yes     Worried About Losing Housing: No     Unable to Get Utilities: No     Counseling given: Not Answered    History reviewed. No pertinent family history.  No family status information on file.        REVIEW OF SYSTEMS:   See HPI    EXAM:   /60   Pulse 86   Temp 97 °F (36.1 °C) (Temporal)   Resp 18   Ht 5' 10\" (1.778 m)   Wt 217 lb (98.4 kg)   BMI 31.14 kg/m²  Estimated body mass index is 31.14 kg/m² as calculated from the following:    Height as of this encounter: 5' 10\" (1.778 m).    Weight as of this encounter: 217 lb (98.4 kg).   Vital signs reviewed. Appears stated age, well groomed.  Physical Exam:  GEN:  Patient is alert and oriented x3, no apparent distress  HEAD:  Normocephalic, atraumatic  HEENT:  no scleral icterus, conjunctivae clear bilaterally, PERRLA, OP clear  LUNGS: clear to auscultation bilaterally, no rales/rhonchi/wheezing  HEART:   Regular rate and rhythm, normal S1/S2, no murmurs, rubs or gallops  ABDOMEN: bowel sounds normal, incisions c/d/I with surgical glue in place, abdomen appropriately tender to palpation around incisions without rebound or guarding, + bruising around incisions  EXTREMITIES:  Moves all extremities well, + tenderness superior to right calf, no edema noted  VASCULAR: 2+ posterior tibialis pulses b/l  NEURO:  CN 2 - 12 grossly intact, gait normal    ASSESSMENT AND PLAN:     Assessment & Plan  Hospital discharge follow-up  Patient presents for hospital follow-up after cholecystectomy and partial right nephrectomy.  Pathology showed renal cell carcinoma of the right kidney.  Keep upcoming follow-up appointments with general surgery and urology, appreciate evaluation recommendations.  Patient has cough with some atypical chest pain that is partially reproducible upon palpation.  EKG showed normal sinus rhythm no evidence of STEMI.  Patient does have right leg pain since after surgery, this is concerning for DVT.  Will check STAT ultrasound to further evaluate.  Follow-up pending results.  Orders:    US VENOUS DOPPLER LEG BILAT - DIAG IMG (CPT=93970); Future    Encounter for postoperative examination after surgery for malignant neoplasm  See above  Orders:    US VENOUS DOPPLER LEG BILAT - DIAG IMG (CPT=93970); Future    Postoperative examination  See above  Orders:    US VENOUS DOPPLER LEG BILAT - DIAG IMG (CPT=93970); Future    S/P cholecystectomy  See above  Orders:    Hepatic Function Panel (7) [E]; Future    Comp Metabolic Panel (14) [E]; Future    US VENOUS DOPPLER LEG BILAT - DIAG IMG (CPT=93970); Future    History of right nephrectomy  See above  Orders:    Hepatic Function Panel (7) [E]; Future    Comp Metabolic Panel (14) [E]; Future    US VENOUS DOPPLER LEG BILAT - DIAG IMG (CPT=93970); Future    Renal cell carcinoma of right kidney (HCC)  Keep upcoming appointment with genetics given patient has family history in  his father of stage IV kidney cancer, unknown type.       Atypical chest pain  Mix of msk and possible GERD, will trial omeprazole   Orders:    EKG with interpretation and Report -IN OFFICE [94522]    Hepatic Function Panel (7) [E]; Future    US VENOUS DOPPLER LEG BILAT - DIAG IMG (CPT=93970); Future    Chest wall pain  Pain medication as needed       Right leg pain  Concern for DVT see above, STAT US ordered  Orders:    US VENOUS DOPPLER LEG BILAT - DIAG IMG (CPT=93970); Future    Chronic cough  Trial of omeprazole for 1-3 months, if no improvement to let me know.   Orders:    US VENOUS DOPPLER LEG BILAT - DIAG IMG (CPT=93970); Future    Omeprazole 40 MG Oral Capsule Delayed Release; Take 1 capsule (40 mg total) by mouth daily.    Elevated blood pressure reading  Patient had elevated blood pressure while in the hospital.  Will continue metoprolol 12.5 mg twice daily at this time.  Continue to check blood pressure at home a few times a week.               Meds & Refills for this Visit:  Requested Prescriptions      No prescriptions requested or ordered in this encounter           Health Maintenance:  Health Maintenance Due   Topic Date Due    DTaP,Tdap,and Td Vaccines (1 - Tdap) Never done    Annual Physical  04/29/2023    COVID-19 Vaccine (4 - 2024-25 season) 09/01/2024    Influenza Vaccine (1) 10/01/2024       Patient/Caregiver Education: There are no barriers to learning. Medical education done.   Outcome: Patient verbalizes understanding. Patient is notified to call with any questions, complications, allergies, or worsening or changing symptoms.  Patient is to call with any side effects or complications from the treatments as a result of today.     Problem List:  Patient Active Problem List   Diagnosis    Class 1 obesity due to excess calories without serious comorbidity with body mass index (BMI) of 30.0 to 30.9 in adult    Episodes of staring    Recurrent biliary colic    Right kidney mass    Chronic  cholecystitis    Pre-op testing       No follow-ups on file.      Quita Soni MD  [facility] Family Medicine  03/31/25      Please note that portions of this note may have been completed with a voice recognition program. Efforts were made to edit the dictations but occasionally words are mis-transcribed. Thank you for your understanding.    The 21st Century Cures Act makes medical notes like these available to patients in the interest of transparency. Please be advised this is a medical document. Medical documents are intended to carry relevant information, facts as evident, and the clinical opinion of the practitioner. The medical note is intended as peer to peer communication and may appear blunt or direct. It is written in medical language and may contain abbreviations or verbiage that are unfamiliar. If there are any questions or concerns please contact the provider for clarification.              [1]   Allergies  Allergen Reactions    Cherry Coughing

## 2025-03-31 NOTE — TELEPHONE ENCOUNTER
Informed patient of results and recommendations.     Patient agrees to plan and verbalized an understanding.     No further questions or concerns.

## 2025-03-31 NOTE — ASSESSMENT & PLAN NOTE
See above  Orders:    Hepatic Function Panel (7) [E]; Future    Comp Metabolic Panel (14) [E]; Future    US VENOUS DOPPLER LEG BILAT - DIAG IMG (CPT=93970); Future

## 2025-03-31 NOTE — PATIENT INSTRUCTIONS
Refill policies:      Allow 3 business days for refills; controlled substances may take longer.  Contact your pharmacy at least 5-7 business days prior to running out of medication and have them send an electronic request or submit through the \"request refill\" option thru your [facility] account. No need to do both, as multiple requests will create an automated [employer] message to notify of a denial for one of the duplicated requests, causing you undue confusion.   Refills are NOT addressed on weekends; covering physicians do not authorize routine medications on weekends.  No narcotics or controlled substances are refilled after noon on Fridays or by on call physicians.  By law, narcotics cannot be faxed or phoned into your pharmacy.  If your prescription is due for a refill, you may be due for a follow up appointment. Please call our office at [phone] to make an appointment or schedule an appointment via [facility].  To best provide you care, patients receiving routine medications need to be seen at least twice a year. Patients receiving narcotic/controlled substance medications need to be seen at least once every 3 months.  In the event that your preferred pharmacy does not have the requested medication in stock (ie Backordered), it is your responsibility to find another pharmacy that has the requested medication available. We will gladly send a new prescription to that pharmacy at your request.  controlled substances may not be able to be filled out of state due to license restrictions.  If you have a planned trip, it's best to call your pharmacy at least 5-7 business days to prevent any delays in your medication refill.    Scheduling Tests:    If your physician has ordered radiology tests such as MRI or CT scans, please contact Central Scheduling at [phone] right away to schedule the test.  Once scheduled, the [facility] Centralized Referral Team will work with your insurance carrier to obtain pre-certification or  prior authorization.  Depending on your insurance carrier, approval may take 3-10 days.  It is highly recommended patients assure they have received an authorization before having a test performed.  If test is done without insurance authorization, patient may be responsible for the entire amount billed.      Precertification and Prior Authorizations:  If your physician has recommended that you have a procedure or additional testing performed the [facility] Centralized Referral Team will contact your insurance carrier to obtain pre-certification or prior authorization.    You are strongly encouraged to contact your insurance carrier to verify that your procedure/test has been approved and is a COVERED benefit.  Although the [facility] Centralized Referral Team does its due diligence, the insurance carrier gives the disclaimer that \"Although the procedure is authorized, this does not guarantee payment.\"    Ultimately the patient is responsible for payment.   Thank you for your understanding in this matter.  Paperwork Completion:  If you require FMLA or disability paperwork for your recovery, please make sure to either drop it off or have it faxed to our office at [phone]. Be sure the form has your name and date of birth on it.  The form will be faxed to our Forms Department and they will complete it for you.  There is a 25$ fee for all forms that need to be filled out.  Please be aware there is a 10-14 day turnaround time.  You will need to sign a release of information ([employer]) form if your paperwork does not come with one.  You may call the Forms Department with any questions at [phone].  Their fax number is [phone].

## 2025-03-31 NOTE — TELEPHONE ENCOUNTER
Caprice from [facility] is informing STAT US venous doppler results were negative.     Impression   CONCLUSION:  No evidence of lower extremity DVT.     Please see US venous doppler results.

## 2025-04-01 ENCOUNTER — PATIENT MESSAGE (OUTPATIENT)
Dept: FAMILY MEDICINE CLINIC | Facility: CLINIC | Age: 25
End: 2025-04-01

## 2025-04-02 ENCOUNTER — TELEPHONE (OUTPATIENT)
Dept: FAMILY MEDICINE CLINIC | Facility: CLINIC | Age: 25
End: 2025-04-02

## 2025-04-02 NOTE — TELEPHONE ENCOUNTER
Patient was seen Monday for hospital follow up and states stomach/sides have gotten more itchy and sensitive. Patient would like to know if he can be seen to get area reevaluated. Please advise.

## 2025-04-02 NOTE — TELEPHONE ENCOUNTER
Patient was seen in office last Monday for hospital follow up post cholecystotomy and partial right nephrectomy. States that itchiness of the whole stomach area worsened, both surgical and non-surgical site. Increased sensitivity as well. Had trouble sleeping last night due to itchiness. Denies redness, rashes, or swelling around the area. Did not use any topical cream or over-the-counter medication. Denies pain. No nausea or vomiting.

## 2025-04-02 NOTE — TELEPHONE ENCOUNTER
Informed patient to contact the surgeon for further evaluation.    Patient agrees to plan and verbalized an understanding.

## 2025-04-02 NOTE — TELEPHONE ENCOUNTER
Because it is around the surgical sites, I recommend he check in with surgery so they can evaluate further.

## 2025-04-03 ENCOUNTER — NURSE ONLY (OUTPATIENT)
Facility: LOCATION | Age: 25
End: 2025-04-03
Payer: COMMERCIAL

## 2025-04-03 VITALS
DIASTOLIC BLOOD PRESSURE: 64 MMHG | HEART RATE: 80 BPM | OXYGEN SATURATION: 98 % | SYSTOLIC BLOOD PRESSURE: 108 MMHG | TEMPERATURE: 98 F

## 2025-04-03 DIAGNOSIS — Z98.890 POST-OPERATIVE STATE: Primary | ICD-10-CM

## 2025-04-03 NOTE — PROGRESS NOTES
Post Operative Visit Note       Active Problems  1. Post-operative state         Chief Complaint   Chief Complaint   Patient presents with    Post-Op     PO- 3/25 w/PBP, XI Robot-Assisted LAPAROSCOPIC CHOLECYSTECTOMY WITH INDOCYANINE GREEN ([facility]), PT reports he is having itchiness around incisions, has pain in lower right abdomen, no other symptoms.          History of Present Illness   The patient presents today for postoperative visit following robotic assisted laparoscopic cholecystectomy by Dr. Mcguire with right partial nephrectomy by  on 3/25/2025.    Patient presents to the clinic today with family present.  He endorses increased pruritus diffusely over his abdomen and flanks that began this past Sunday.  He states severity of pruritus has caused him difficulty sleeping.  He denies radiation of pruritus as well as changes in severity since the onset.  He denies erythema to affected areas.  He denies fever and chills.  He denies recent illness.  Patient states he has not tried anything to improve pruritus due to fear of making it worse.  He denies history of skin sensitivity.  Patient states he began taking new medication for gastric reflux after onset of symptoms.    Patient is otherwise doing well after the his procedure.  He is tolerating abdominal pain with Tylenol and states he stopped taking the prescribed oxycodone this past Thursday.  He is tolerating diet without nausea and vomiting.  Patient is taking stool softener and denies constipation and diarrhea.    Allergies  Nolan is allergic to gonzalez.    Past Medical / Surgical / Social / Family History    The past medical and past surgical history have been reviewed by me today.     Past Medical History:    Cancer (HCC)    right kidney    Constipation    Right kidney mass    Visual impairment    glasses     Past Surgical History:   Procedure Laterality Date    Cholecystectomy  03/25/25    Laparoscopic cholecystectomy  03/25/2025     Other surgical history  10/20/1998    Eye surgery       The family history and social history have been reviewed by me today.    History reviewed. No pertinent family history.  Social History     Socioeconomic History    Marital status:    Tobacco Use    Smoking status: Never    Smokeless tobacco: Never   Vaping Use    Vaping status: Never Used   Substance and Sexual Activity    Alcohol use: Not Currently     Comment: 1 drink a year    Drug use: Never   Other Topics Concern    Caffeine Concern Yes    Stress Concern No    Weight Concern No    Special Diet No    Exercise Yes    Seat Belt No        Current Outpatient Medications:     Omeprazole 40 MG Oral Capsule Delayed Release, Take 1 capsule (40 mg total) by mouth daily., Disp: 30 capsule, Rfl: 2    docusate sodium 100 MG Oral Cap, Take 100 mg by mouth 2 (two) times daily., Disp: 60 capsule, Rfl: 0    ondansetron (ZOFRAN) 4 mg tablet, Take 1 tablet (4 mg total) by mouth every 8 (eight) hours as needed for Nausea., Disp: 20 tablet, Rfl: 0    metoprolol tartrate 25 MG Oral Tab, Take 0.5 tablets (12.5 mg total) by mouth 2 (two) times daily., Disp: 30 tablet, Rfl: 0    oxyCODONE 10 MG Oral Tab, Take 1 tablet (10 mg total) by mouth every 4 (four) hours as needed. (Patient not taking: Reported on 4/3/2025), Disp: 20 tablet, Rfl: 0    Naloxone HCl 4 MG/0.1ML Nasal Liquid, 4 mg by Nasal route as needed. If patient remains unresponsive, repeat dose in other nostril 2-5 minutes after first dose. (Patient not taking: Reported on 4/3/2025), Disp: 1 kit, Rfl: 0      Review of Systems  A 10 point Review of Systems has been completed by me today and is negative except as above in the HPI.    Physical Findings   /64 (BP Location: Right arm, Patient Position: Sitting, Cuff Size: adult)   Pulse 80   Temp 97.5 °F (36.4 °C) (Temporal)   SpO2 98%   Gen/psych: alert and oriented, cooperative, no apparent distress  Cardiovascular: regular rate  Respiratory: respirations  unlabored, no wheeze  Abdominal: soft, non-tender, non-distended, no guarding/rebound. + Diffuse small red pinpoint lesions across abdomen and bilateral flanks.  Incisions: Laparoscopic incisions are clean, dry, intact.  No surrounding erythema, induration, or drainage. No evidence of hives.         Assessment/Plan  1. Post-operative state        Advised patient to take oral antihistamine such as Zyrtec or Claritin daily for pruritus.  He may also apply cold compress or topical antihistamine to specific areas that are extremely itchy. Advised patient that he should avoid applying lotions or ointments directly over incisions, as they can prematurely dissolve dermal sutures. He should continue to monitor the severity and location of pruritus and pinpoint lesions.  Advised patient to shower in lukewarm water and to avoid exposure to hot water in the affected area.  Continue to cleanse incisions with gentle soap and water.  Continue regular diet as tolerated.  Continue Tylenol for pain as needed.  Discussed pathology with patient.  A. 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  -Surgical margins are negative.    B. Gallbladder, cholecystectomy:  -Cholesterolosis.     Continue no heavy lifting over 15 pounds until 4 weeks after procedure date (4/22/2025).  Patient plans to follow-up with urology regarding pathology on 4/10/2025.  Follow-up in 1 week if symptoms have not improved.  If symptoms resolve, follow-up as needed.           No orders of the defined types were placed in this encounter.       Imaging & Referrals   None    Follow Up  Follow-up in 1 week if symptoms have not improved.  If symptoms resolve, follow-up as needed.    ESTEBAN Hand  [facility] General Surgery  4/3/2025  8:50 AM

## 2025-04-04 ENCOUNTER — APPOINTMENT (OUTPATIENT)
Age: 25
End: 2025-04-04
Attending: UROLOGY
Payer: COMMERCIAL

## 2025-04-10 ENCOUNTER — TELEPHONE (OUTPATIENT)
Dept: FAMILY MEDICINE CLINIC | Facility: CLINIC | Age: 25
End: 2025-04-10

## 2025-04-10 ENCOUNTER — TELEPHONE (OUTPATIENT)
Facility: LOCATION | Age: 25
End: 2025-04-10

## 2025-04-10 NOTE — TELEPHONE ENCOUNTER
Patient has follow up with urologist, Dr had mentioned a PET Scan and patient is wanting to know if Dr wanted to move forward with that. Patient is also wanting to know if Dr would want to order chest x-ray because he has been having a more constant cough.

## 2025-04-10 NOTE — TELEPHONE ENCOUNTER
I recommend following with Dr. Peña with the CT scans every 6mo to monitor. Should try the omeprazole for 1-3mo to monitor for improvement in the cough. If cough is now productive, fevers etc should be acutely evaluated. Thank you.

## 2025-04-10 NOTE — TELEPHONE ENCOUNTER
Spoke with patient and he states he saw his urologist this morning for follow up on his kidney cancer.  Per patient he was informed by Dr. Peña that his kidney CA is in stage 1B and it was contained and removed that he does not recommend doing a PET scan at this time, but does recommend doing a CT scan every 6 months to monitor.    Patient states Dr. Soni has mentioned the PET scan before and wants to know if she would like to go forward with it.    Also states when he was here on 3/31/2025 he complained of cough and heartburn and was informed that the cough may be from his heartburn.  Per patient his heartburn is resolved, but his cough continues and he wants to know if Dr. Boyle would order a chest xray?

## 2025-04-15 NOTE — TELEPHONE ENCOUNTER
Received duplicate disability with Protected health information request attached. Logged for processing. Valid authorization attached.

## 2025-04-15 NOTE — TELEPHONE ENCOUNTER
Called patient to check if he returned to work on 4/13 as scheduled, patient states he got an extension on 4/2. 4/2 visit was with external provider. Tasking MD. Extension would be revision.

## 2025-04-16 NOTE — TELEPHONE ENCOUNTER
Revised FMLa and disability to reflect new Rtw date. Efaxed FMLa, disability, and Phi to the [employer] [phone]. Sent copy via [employer].

## 2025-05-08 ENCOUNTER — NURSE ONLY (OUTPATIENT)
Age: 25
End: 2025-05-08
Attending: STUDENT IN AN ORGANIZED HEALTH CARE EDUCATION/TRAINING PROGRAM
Payer: COMMERCIAL

## 2025-05-08 DIAGNOSIS — Z80.51 FAMILY HISTORY OF CANCER OF THE KIDNEY: ICD-10-CM

## 2025-05-08 DIAGNOSIS — C64.1 RENAL CELL ADENOCARCINOMA, RIGHT (HCC): Primary | ICD-10-CM

## 2025-05-08 NOTE — PROGRESS NOTES
Referring Provider:  Pedro Jones MD    Additional Provider(s):  Pia Peña MD (fax: [phone])      Quita Soni MD    Reason for Referral:  Nolan Martínez Jr was referred for genetic counseling because of a personal history of renal cancer and a family history of cancer. Mr. Martínez is a 24 year-old man of [place] descent was diagnosed with clear cell renal cell carcinoma of the right kidney on 25. His reported medical history is otherwise non-contributory to this consultation.     Social History:  Mr. Martínez was seen today by himself. Mr. Martínez lives in [place].     Family History:   A three generation pedigree was obtained.    Mr. Martínez has one younger brother and no sisters.      Mr. Martínez's mother is around 48 years-old and has not had cancer. Mr. Martínez's mother has one brother and four sisters. One of Mr. Martínez's maternal aunts had breast cancer in her 40s. Mr. Martínez's maternal grandmother is living in her late 70s and has not had cancer. Mr. Martínez's grandmother's niece  from ovarian cancer at an unspecified age. Mr. Martínez's maternal grandfather is living in his late 70s and has not had cancer.     Mr. Martínez's father was diagnosed with papillary urothelial carcinoma at age 47 for which he had a unilateral nephrectomy. Mr. Martínez's father is currently 48 years-old and lives in [place]. He has not had genetic testing. Mr. Martínez's father has 14 siblings, all of whom are living. One of Mr. Martínez's paternal uncles had either prostate or testicular cancer in his 50s. One of Mr. Martínez's paternal cousins (daughter of an aunt)  in her 40s from gastric cancer. Mr. Martínez is not aware of any other cancers in his paternal aunts, uncles, and cousins. Mr. Martínez's paternal grandparents are living in their 80s or 90s and have not had cancer.     Please see the pedigree for additional family history information.     Counseling:   The following information was  discussed with Mr. Martínez.    Genetics of Cancer:  The majority of cancers are sporadic whereas approximately 10-30% of cases of cancer cases are attributed to familial factors such as unidentified low penetrance genes in the family or shared environmental factors.  Approximately 5-10% of cancers are related to a hereditary cancer syndrome.  Signs of a hereditary cancer syndrome include some rare cancers, common cancers occurring at unusually young ages, multiple primary cancers in the some individual, or the same type of cancer or related cancers (e.g., breast and ovarian, colorectal and endometrial) in three or more individuals in the same lineage.      Genetics of Renal Cell Carcinoma(RCC):  The vast majority of RCC cases are sporadic. Risk factors for RCC include age, smoking, and obesity.  Approximately 2-3% of RCC cases are familial.  Clear cell RCC is a feature of several well described genetic conditions including von Hippel-Lindau (VHL) syndrome, and, more rarely, hereditary paraganglioma/pheochromocytoma syndrome, Dnlv-Mive-Bygo (BHD) syndrome, and tuberous sclerosis (TS).        Risk Assessment:   Mr. Martínez meets NCCN Guidelines (v3.2025) for genetic testing for hereditary renal cell carcinoma syndromes based on his personal history of clear cell renal cancer <46. I recommend testing as part of a multigene panel that includes Velasco syndrome based on his reported paternal family history of papillary urothelial carcinoma in his father and gastric cancer in a paternal cousin, both <50.      Genetic Testing:  The pros, cons, and limitations of genetic testing were discussed at length, including the potential implications of test results on clinical management (see below).       If a pathogenic variant is not identified (negative result), it is still possible that Mr. Martínez has a pathogenic variant in one of these genes that was not detected by the genetic test, that the family is dealing with a  hereditary cancer syndrome involving a different gene, or that his relatives had cancer due to a mutation that Mr. Martínez did not inherit.  If no genetic mutation is identified in the family, options for cancer screening/management should be determined according to personal and family histories and should be discussed at length with physicians.       A variant of uncertain significance is a DNA change that may or may not alter the function of the gene; therefore, it is usually not possible to determine if the gene variant is responsible for an individual's increased cancer risk.       If Mr. Martínez is found to carry a pathogenic variant, he is at significantly increased risk for various cancers. This knowledge would influence his medical recommendations and choices regarding surveillance, screening, and prevention.  It was stressed that genetic testing detects the presence of a gene mutation, it does not detect the presence of cancer or predict when, or if, an individual will develop cancer.  If Mr. Martínez were to test positive for a pathogenic variant, his children and siblings would have a 50% chance of carrying the same mutation.  They would have the option of pursuing targeted genetic testing at a reduced cost to clarify their cancer risks. Genetic test results have implications for Mr. Martínez's entire biological family.  Thus, it is recommended that he share his genetic test results with his biological family members so that they may have their risk assessed.     Genetic Information Non-Discrimination Act:  The legal protections of the Genetic Information Nondiscrimination Act (LEROY) for health insurance and employment were discussed.  LEROY does not provide protection for life insurance, disability or long-term care insurance.     Summary and Plan:  Mr. Martínez was referred for genetic counseling due to recent diagnosis of renal carcinoma at age 24. We reviewed the differences between sporadic and  hereditary cancers. We reviewed the benefits and limitations of genetic testing and the possible test results including a negative result, identification of a variant of uncertain significance, and a positive result.       At the conclusion of the counseling session Mr. Martínez decided to proceed with testing for hereditary renal cancer susceptibility genes.  Blood and paperwork were sent to [employer] for their [facility] Hereditary Renal/Urinary Tract Cancers and Breast/Gyn Cancers Panels. I anticipate that Mr. Martínez's results will be available within 2-3 weeks and will call him with results.  Results will also be communicated to Dr. Soni, Dr. Jones, and Dr. Peña.       Approximately 55 minutes was spent in coordiniation of care of Mr. Martínez.

## 2025-05-09 ENCOUNTER — OFFICE VISIT (OUTPATIENT)
Dept: SURGERY | Facility: CLINIC | Age: 25
End: 2025-05-09
Payer: COMMERCIAL

## 2025-05-09 DIAGNOSIS — N28.89 RENAL MASS: Primary | ICD-10-CM

## 2025-05-09 DIAGNOSIS — R31.29 MICROHEMATURIA: ICD-10-CM

## 2025-05-09 LAB
APPEARANCE: CLEAR
BILIRUBIN: NEGATIVE
GLUCOSE (URINE DIPSTICK): NEGATIVE MG/DL
KETONES (URINE DIPSTICK): NEGATIVE MG/DL
LEUKOCYTES: NEGATIVE
MULTISTIX LOT#: ABNORMAL NUMERIC
NITRITE, URINE: NEGATIVE
PH, URINE: 6 (ref 4.5–8)
PROTEIN (URINE DIPSTICK): NEGATIVE MG/DL
SPECIFIC GRAVITY: 1.01 (ref 1–1.03)
URINE-COLOR: YELLOW
UROBILINOGEN,SEMI-QN: 0.2 MG/DL (ref 0–1.9)

## 2025-05-09 PROCEDURE — 99214 OFFICE O/P EST MOD 30 MIN: CPT | Performed by: UROLOGY

## 2025-05-09 PROCEDURE — 81002 URINALYSIS NONAUTO W/O SCOPE: CPT | Performed by: UROLOGY

## 2025-05-09 PROCEDURE — G2211 COMPLEX E/M VISIT ADD ON: HCPCS | Performed by: UROLOGY

## 2025-05-09 NOTE — PROGRESS NOTES
Urology Clinic Note    Primary Care Provider:  Quita Soni MD     Chief Complaint:   Partial nephrectomy follow-up    HPI:   Nolan Martínez Jr. is a 24 year old male with history of right right sided renal mass status post partial nephrectomy on 3/25/25 who presents for follow-up.    Patient establish care with me on 3/6/2025.  At that time he had had microscopic hematuria and right upper quadrant pain.  Imaging was done and showed a large cystic lesion in the lower pole of the right kidney, complex cyst versus mass.  Follow-up imaging was done which confirmed a mass of the kidney 6 cm in size, concerning for RCC.  Also had gallbladder sludge.  Patient has a father with renal cell carcinoma, this was apparently metastatic and found at a young age.  Given challenging position of tumor in size, he eventually underwent a robotic assisted right partial nephrectomy with Dr. Peña with concurrent right cholecystectomy with Dr. Mcguire on 3/25; this was uncomplicated.  Patient saw a genetic counselor yesterday, hereditary renal cell carcinoma testing is pending.  Note, patient reports that he was actually told that his father does not have renal cell carcinoma, was actually diagnosed with papillary urothelial cell carcinoma.  Patient is recovering well from his surgery.  He has no complaints.  No urinary issues.  No gross hematuria.  Pathology-Clear-cell renal cell carcinoma, 5.6 cm in size, negative margins.  Thierry grade 2.  T1b.   UA today with moderate blood    PSA:  No results found for: \"PSA\", \"PERCENTPSA\", \"PSAS\", \"PSAULTRA\", \"QPSA\", \"PSATOT\", \"TOTPSADX\", \"TOTPSASCREEN\"     History:     Past Medical History:    Cancer (HCC)    right kidney    Constipation    Right kidney mass    Visual impairment    glasses       Past Surgical History:   Procedure Laterality Date    Cholecystectomy  03/25/25    Laparoscopic cholecystectomy  03/25/2025    Other surgical history  10/20/1998    Eye surgery       No  family history on file.    Social History     Socioeconomic History    Marital status:    Tobacco Use    Smoking status: Never    Smokeless tobacco: Never   Vaping Use    Vaping status: Never Used   Substance and Sexual Activity    Alcohol use: Not Currently     Comment: 1 drink a year    Drug use: Never   Other Topics Concern    Caffeine Concern Yes    Stress Concern No    Weight Concern No    Special Diet No    Exercise Yes    Seat Belt No     Social Drivers of Health     Food Insecurity: No Food Insecurity (3/25/2025)    NCSS - Food Insecurity     Worried About Running Out of Food in the Last Year: No     Ran Out of Food in the Last Year: No   Transportation Needs: No Transportation Needs (3/25/2025)    NCSS - Transportation     Lack of Transportation: No   Housing Stability: Not At Risk (3/25/2025)    NCSS - Housing/Utilities     Has Housing: Yes     Worried About Losing Housing: No     Unable to Get Utilities: No       Medications (Active prior to today's visit):  Current Outpatient Medications   Medication Sig Dispense Refill    Omeprazole 40 MG Oral Capsule Delayed Release Take 1 capsule (40 mg total) by mouth daily. 30 capsule 2    oxyCODONE 10 MG Oral Tab Take 1 tablet (10 mg total) by mouth every 4 (four) hours as needed. (Patient not taking: Reported on 4/3/2025) 20 tablet 0    docusate sodium 100 MG Oral Cap Take 100 mg by mouth 2 (two) times daily. 60 capsule 0    Naloxone HCl 4 MG/0.1ML Nasal Liquid 4 mg by Nasal route as needed. If patient remains unresponsive, repeat dose in other nostril 2-5 minutes after first dose. (Patient not taking: Reported on 4/3/2025) 1 kit 0    ondansetron (ZOFRAN) 4 mg tablet Take 1 tablet (4 mg total) by mouth every 8 (eight) hours as needed for Nausea. 20 tablet 0    metoprolol tartrate 25 MG Oral Tab Take 0.5 tablets (12.5 mg total) by mouth 2 (two) times daily. 30 tablet 0       Allergies:  Allergies   Allergen Reactions    Cherry Coughing       Review of  Systems:   A comprehensive 10-point review of systems was completed.  Pertinent positives and negatives are noted in the the HPI.    Physical Exam:   CONSTITUTIONAL: Well developed, well nourished, in no acute distress  ABDOMEN: Soft, non-tender, non-distended- incisions CDI        Assessment & Plan:     Renal cell carcinoma  Status post right-sided partial nephrectomy    Pathology was discussed in detail  NCCN guidelines below discussed.  Plan for MRI of the abdomen and chest x-ray in the next couple months.   Will check BMP at follow-up.  Follow-up genetic workup.      Microhematuria  Patient previously with microscopic materia.  Cystoscopy was not completed previously.  He has moderate blood on UA today.  I offered to schedule him for cystoscopy but he wants to hold off for now given his recent surgery.  He wants to recheck a UA in the future.  If positive he is then willing to undergo cystoscopy.  We did discuss that he is at increased risk for UCC given his father's history.  He understands this.  Outpatient UA order placed.  If repeat is positive, patient would like to change his follow-up visit to a cystoscopy.      Return in 2/3 mo with the above       In total, 30 minutes were spent on this patient encounter (including chart review, patient history, physical, and counseling, documentation, and communication).    Pedro Jones MD  Staff Urologist  [facility]  Office: [phone]

## 2025-05-23 ENCOUNTER — GENETICS ENCOUNTER (OUTPATIENT)
Age: 25
End: 2025-05-23

## 2025-05-23 NOTE — PROGRESS NOTES
Referring Provider:                    Pedro Jones MD     Additional Provider(s):              Pia Peña MD (fax: [phone])                                                      Quita Soni MD    Reason for Referral:  Nolan Martínez Jr had genetic testing performed on 05/08/25 because of a personal diagnosis of clear cell renal carcinom at age 26 and a family history of cancer.     Genetic Testing Result:  No known pathogenic variants were found in 42 genes including: LADAN*, BAP1, BARD1, BLM, BRCA1, BRCA2, BRIP1, BUB1B, CDC73, CDH1, CDKN1C, CEP57*, CHEK2, DICER1*, DIS3L2*, EPCAM*, FH*, FLCN, GPC3*, MET*, MLH1*, MSH2*, MSH6*, NF1*, PALB2, PMS2*, PTEN*, RAD51C, RAD51D, REST, SDHB, SDHC*, SMARCA4, SMARCB1, STK11, TP53, TRIM28, TRIP13, TSC1*, TSC2, VHL, WT1. Two variants of uncertain significance, CHEK2 c.1133C>T (p.[id number]) and MSH2 c.373_375del (p.Hyk695fxn), were identified. Please refer to the report from [facility] ([id number]) for additional testing information. These results were discussed with Mr. Martínez by phone on 05/23/25.      Summary and Plan:  The etiology of Mr. Martínez's cancer remains unexplained. The limitations of the testing include the chance that a pathogenic variant in a gene other than those included in this panel might be the cause of cancer in Mr. Martínez. It is not known if the CHEK2 or MSH2 variants identified in Mr. Martínez are associated with an increased risk for cancer or if they are benign polymorphisms. As more families are tested and/or functional studies are performed, it is possible that these variants will be reclassified in the future either as benign or deleterious. Mr. Martínez should contact me on an annual basis to see if the VUSs have been reclassified and to learn if there have been any updates in genetic testing that would apply to him.    In the meantime, Mr. Martínez and his relatives should speak with their physicians to discuss recommended medical  management according to their personal and family history.     Cc:  Nolan Martínez

## 2025-05-29 ENCOUNTER — TELEPHONE (OUTPATIENT)
Dept: FAMILY MEDICINE CLINIC | Facility: CLINIC | Age: 25
End: 2025-05-29

## 2025-05-29 DIAGNOSIS — R05.3 CHRONIC COUGH: ICD-10-CM

## 2025-05-29 NOTE — TELEPHONE ENCOUNTER
LOV:03/31/2025      NOV:due end of June for follow up     Medication:   Omeprazole 40 mg 1 daily # 90 0 refills. Refill pended until patient calls back to make an appointment.       MCM sent to pt.to call the office. Last office visit was 03/31/2025. Dr. Soni wanted a follow up at the last office visit in 3 months. Please make appointment for patient.

## 2025-05-30 RX ORDER — OMEPRAZOLE 40 MG/1
40 CAPSULE, DELAYED RELEASE ORAL DAILY
Qty: 90 CAPSULE | Refills: 0 | Status: SHIPPED | OUTPATIENT
Start: 2025-05-30 | End: 2025-08-28

## 2025-06-16 ENCOUNTER — OFFICE VISIT (OUTPATIENT)
Dept: FAMILY MEDICINE CLINIC | Facility: CLINIC | Age: 25
End: 2025-06-16
Payer: COMMERCIAL

## 2025-06-16 VITALS
SYSTOLIC BLOOD PRESSURE: 118 MMHG | HEART RATE: 92 BPM | DIASTOLIC BLOOD PRESSURE: 74 MMHG | TEMPERATURE: 97 F | BODY MASS INDEX: 32.05 KG/M2 | RESPIRATION RATE: 16 BRPM | WEIGHT: 223.88 LBS | HEIGHT: 70 IN

## 2025-06-16 DIAGNOSIS — R05.3 CHRONIC COUGH: Primary | ICD-10-CM

## 2025-06-16 PROCEDURE — G2211 COMPLEX E/M VISIT ADD ON: HCPCS

## 2025-06-16 PROCEDURE — 99213 OFFICE O/P EST LOW 20 MIN: CPT

## 2025-06-16 PROCEDURE — 3078F DIAST BP <80 MM HG: CPT

## 2025-06-16 PROCEDURE — 3074F SYST BP LT 130 MM HG: CPT

## 2025-06-16 PROCEDURE — 3008F BODY MASS INDEX DOCD: CPT

## 2025-06-16 NOTE — PROGRESS NOTES
[facility] Family Medicine Office Note  Chief Complaint:   Chief Complaint   Patient presents with    Medication Follow-Up       HPI:   This is a 24 year old male coming into discuss Omeprazole.  Patient has been taking it for the last 3 months for chronic cough.  States that the cough has gone away.  Wants to know if he needs to continue the medications.      Past Medical History[1]  Past Surgical History[2]  Social History:  Short Social Hx on File[3]  Family History:  Family History[4]  Allergies:  Allergies[5]  Current Meds:  Current Medications[6]   Counseling given: Not Answered       REVIEW OF SYSTEMS:   ROS:  CONSTITUTIONAL:  Denies any unusual weight gain/loss, fever, chills, weakness or fatigue.  HEENT:  Eyes:  Denies visual loss, blurred vision, double vision or yellow sclerae.   SKIN:  No rash or itching.  CARDIOVASCULAR:  Denies chest pain, chest pressure or chest discomfort. No palpitations or edema.  Denies any dyspnea on exertion or at rest  RESPIRATORY:  Denies shortness of breath, cough  GASTROINTESTINAL:  Denies any abdominal pain, nausea, vomiting, constipation, diarrhea, or blood in stool.      EXAM:   /74   Pulse 92   Temp 97.4 °F (36.3 °C) (Temporal)   Resp 16   Ht 5' 10\" (1.778 m)   Wt 223 lb 14.4 oz (101.6 kg)   BMI 32.13 kg/m²  Estimated body mass index is 32.13 kg/m² as calculated from the following:    Height as of this encounter: 5' 10\" (1.778 m).    Weight as of this encounter: 223 lb 14.4 oz (101.6 kg).   Vital signs reviewed.Appears stated age, well groomed.  Physical Exam:  GEN:  Patient is alert and oriented x3, no apparent distress  HEAD:  Normocephalic, atraumatic  HEENT:  Eyes: EOMI, PERRLA, no scleral icterus, conjunctivae clear bilaterally.    LUNGS: clear to auscultation bilaterally, no rales/rhonchi/wheezing  HEART:  Regular rate and rhythm, no murmurs, rubs or gallops  ABDOMEN:  Soft, nondistended, nontender, bowel sounds normal in all 4 quadrants, no  hepatosplenomegaly  EXTREMITIES:  Strength intact with 5/5 bilaterally upper and lower extremities, no edema noted  NEURO:  CN 2 - 12 grossly intact     ASSESSMENT AND PLAN:   1. Chronic cough  Patient's chronic cough was relieved with taking omeprazole 40 mg daily.  Discussed with patient that it would be okay to stop it at this time.  Advised patient to take it preemptively prior to a heavy, fatty, or spicy meal or consuming alcohol.  Advised patient that if this does not help keep reflux symptoms at bay he may need to see GI.  Patient verbalized understanding.  Patient is due for some blood work.  Also is due for annual physical.  Patient advised to schedule follow-up when able.      Meds & Refills for this Visit:  Requested Prescriptions      No prescriptions requested or ordered in this encounter       Health Maintenance:  Health Maintenance Due   Topic Date Due    Annual Physical  Never done    Pneumococcal Vaccine: Birth to 50yrs (1 of 2 - PCV) Never done    DTaP,Tdap,and Td Vaccines (1 - Tdap) Never done    Zoster Vaccines (1 of 2) Never done    COVID-19 Vaccine (4 - 2024-25 season) 09/01/2024       Patient/Caregiver Education: Patient/Caregiver Education: There are no barriers to learning. Medical education done.   Outcome: Patient verbalizes understanding. Patient is notified to call with any questions, complications, allergies, or worsening or changing symptoms.  Patient is to call with any side effects or complications from the treatments as a result of today.     Problem List:  Problem List[7]    Quita Edmonds, AKIKO    Please note that portions of this note may have been completed with a voice recognition program. Efforts were made to edit the dictations but occasionally words are mis-transcribed.         [1]   Past Medical History:   Cancer (HCC)    right kidney    Constipation    Renal cancer (HCC)    Cancer on right kidney    Right kidney mass    Visual impairment    glasses   [2]   Past Surgical  History:  Procedure Laterality Date    Cholecystectomy  03/25/25    Laparoscopic cholecystectomy  03/25/2025    Other surgical history  10/20/1998    Eye surgery   [3]   Social History  Socioeconomic History    Marital status:    Tobacco Use    Smoking status: Never    Smokeless tobacco: Never   Vaping Use    Vaping status: Never Used   Substance and Sexual Activity    Alcohol use: Not Currently     Comment: 1 drink a year    Drug use: Never   Other Topics Concern    Caffeine Concern Yes    Stress Concern No    Weight Concern No    Special Diet No    Exercise Yes    Seat Belt No     Social Drivers of Health     Food Insecurity: No Food Insecurity (3/25/2025)    NCSS - Food Insecurity     Worried About Running Out of Food in the Last Year: No     Ran Out of Food in the Last Year: No   Transportation Needs: No Transportation Needs (3/25/2025)    NCSS - Transportation     Lack of Transportation: No   Housing Stability: Not At Risk (3/25/2025)    NCSS - Housing/Utilities     Has Housing: Yes     Worried About Losing Housing: No     Unable to Get Utilities: No   [4]   Family History  Problem Relation Age of Onset    Cancer Father         Cancer on left kidney    Diabetes Maternal Grandfather     Diabetes Maternal Grandmother    [5]   Allergies  Allergen Reactions    Cherry Coughing   [6]   Current Outpatient Medications   Medication Sig Dispense Refill    Omeprazole 40 MG Oral Capsule Delayed Release Take 1 capsule (40 mg total) by mouth daily. 90 capsule 0    docusate sodium 100 MG Oral Cap Take 100 mg by mouth 2 (two) times daily. (Patient not taking: Reported on 6/16/2025) 60 capsule 0    Naloxone HCl 4 MG/0.1ML Nasal Liquid 4 mg by Nasal route as needed. If patient remains unresponsive, repeat dose in other nostril 2-5 minutes after first dose. 1 kit 0    metoprolol tartrate 25 MG Oral Tab Take 0.5 tablets (12.5 mg total) by mouth 2 (two) times daily. (Patient not taking: Reported on 6/16/2025) 30 tablet 0     oxyCODONE 10 MG Oral Tab Take 1 tablet (10 mg total) by mouth every 4 (four) hours as needed. (Patient not taking: Reported on 6/16/2025) 20 tablet 0    ondansetron (ZOFRAN) 4 mg tablet Take 1 tablet (4 mg total) by mouth every 8 (eight) hours as needed for Nausea. (Patient not taking: Reported on 6/16/2025) 20 tablet 0   [7]   Patient Active Problem List  Diagnosis    Class 1 obesity due to excess calories without serious comorbidity with body mass index (BMI) of 30.0 to 30.9 in adult    Episodes of staring    Recurrent biliary colic    Right kidney mass    Chronic cholecystitis    Pre-op testing    S/P cholecystectomy    History of right nephrectomy    Renal cell carcinoma of right kidney (HCC)

## 2025-08-02 ENCOUNTER — LAB ENCOUNTER (OUTPATIENT)
Dept: LAB | Age: 25
End: 2025-08-02
Attending: UROLOGY

## 2025-08-02 ENCOUNTER — HOSPITAL ENCOUNTER (OUTPATIENT)
Dept: GENERAL RADIOLOGY | Age: 25
Discharge: HOME | End: 2025-08-02
Attending: UROLOGY

## 2025-08-02 DIAGNOSIS — N28.89 RENAL MASS: ICD-10-CM

## 2025-08-02 DIAGNOSIS — R31.29 MICROHEMATURIA: ICD-10-CM

## 2025-08-02 DIAGNOSIS — Z90.5 HISTORY OF RIGHT NEPHRECTOMY: ICD-10-CM

## 2025-08-02 DIAGNOSIS — R74.8 ELEVATED LIVER ENZYMES: ICD-10-CM

## 2025-08-02 LAB
ALBUMIN SERPL-MCNC: 4.6 G/DL (ref 3.2–4.8)
ALBUMIN/GLOB SERPL: 1.6 (ref 1–2)
ALP LIVER SERPL-CCNC: 56 U/L (ref 45–117)
ALT SERPL-CCNC: 49 U/L (ref 10–49)
ANION GAP SERPL CALC-SCNC: 10 MMOL/L (ref 0–18)
AST SERPL-CCNC: 27 U/L (ref ?–34)
BILIRUB SERPL-MCNC: 0.4 MG/DL (ref 0.3–1.2)
BUN BLD-MCNC: 17 MG/DL (ref 9–23)
CALCIUM BLD-MCNC: 9.2 MG/DL (ref 8.7–10.6)
CHLORIDE SERPL-SCNC: 106 MMOL/L (ref 98–112)
CO2 SERPL-SCNC: 29 MMOL/L (ref 21–32)
CREAT BLD-MCNC: 1.11 MG/DL (ref 0.7–1.3)
EGFRCR SERPLBLD CKD-EPI 2021: 94 ML/MIN/1.73M2 (ref 60–?)
FASTING STATUS PATIENT QL REPORTED: YES
GLOBULIN PLAS-MCNC: 2.9 G/DL (ref 2–3.5)
GLUCOSE BLD-MCNC: 98 MG/DL (ref 70–99)
OSMOLALITY SERPL CALC.SUM OF ELEC: 302 MOSM/KG (ref 275–295)
POTASSIUM SERPL-SCNC: 3.8 MMOL/L (ref 3.5–5.1)
PROT SERPL-MCNC: 7.5 G/DL (ref 5.7–8.2)
RBC #/AREA URNS AUTO: >10 /HPF
SODIUM SERPL-SCNC: 145 MMOL/L (ref 136–145)

## 2025-08-02 PROCEDURE — 36415 COLL VENOUS BLD VENIPUNCTURE: CPT

## 2025-08-02 PROCEDURE — 71046 X-RAY EXAM CHEST 2 VIEWS: CPT | Performed by: UROLOGY

## 2025-08-02 PROCEDURE — 81015 MICROSCOPIC EXAM OF URINE: CPT

## 2025-08-02 PROCEDURE — 80053 COMPREHEN METABOLIC PANEL: CPT

## 2025-08-05 ENCOUNTER — HOSPITAL ENCOUNTER (OUTPATIENT)
Dept: MRI IMAGING | Facility: HOSPITAL | Age: 25
Discharge: HOME | End: 2025-08-05
Attending: UROLOGY

## 2025-08-05 DIAGNOSIS — N28.89 RENAL MASS: ICD-10-CM

## 2025-08-05 PROCEDURE — 74183 MRI ABD W/O CNTR FLWD CNTR: CPT | Performed by: UROLOGY

## 2025-08-05 PROCEDURE — A9575 INJ GADOTERATE MEGLUMI 0.1ML: HCPCS | Performed by: UROLOGY

## 2025-08-05 RX ORDER — GADOTERATE MEGLUMINE 376.9 MG/ML
20 INJECTION INTRAVENOUS
Status: COMPLETED | OUTPATIENT
Start: 2025-08-05 | End: 2025-08-05

## 2025-08-05 RX ADMIN — GADOTERATE MEGLUMINE 20 ML: 376.9 INJECTION INTRAVENOUS at 15:55:00

## 2025-08-07 ENCOUNTER — TELEPHONE (OUTPATIENT)
Dept: SURGERY | Facility: CLINIC | Age: 25
End: 2025-08-07

## 2025-08-13 ENCOUNTER — PROCEDURE (OUTPATIENT)
Dept: SURGERY | Facility: CLINIC | Age: 25
End: 2025-08-13

## 2025-08-13 VITALS — HEART RATE: 90 BPM | DIASTOLIC BLOOD PRESSURE: 84 MMHG | SYSTOLIC BLOOD PRESSURE: 138 MMHG

## 2025-08-13 DIAGNOSIS — R31.29 MICROHEMATURIA: ICD-10-CM

## 2025-08-13 DIAGNOSIS — N28.89 RENAL MASS: ICD-10-CM

## 2025-08-13 DIAGNOSIS — R82.90 URINE FINDING: Primary | ICD-10-CM

## 2025-08-13 LAB
APPEARANCE: CLEAR
BILIRUBIN: NEGATIVE
GLUCOSE (URINE DIPSTICK): NEGATIVE MG/DL
KETONES (URINE DIPSTICK): NEGATIVE MG/DL
LEUKOCYTES: NEGATIVE
MULTISTIX LOT#: ABNORMAL NUMERIC
NITRITE, URINE: NEGATIVE
PH, URINE: 5.5 (ref 4.5–8)
PROTEIN (URINE DIPSTICK): NEGATIVE MG/DL
SPECIFIC GRAVITY: 1.02 (ref 1–1.03)
URINE-COLOR: YELLOW
UROBILINOGEN,SEMI-QN: 0.2 MG/DL (ref 0–1.9)

## 2025-08-13 PROCEDURE — 99213 OFFICE O/P EST LOW 20 MIN: CPT | Performed by: UROLOGY

## 2025-08-13 PROCEDURE — 3079F DIAST BP 80-89 MM HG: CPT | Performed by: UROLOGY

## 2025-08-13 PROCEDURE — 52000 CYSTOURETHROSCOPY: CPT | Performed by: UROLOGY

## 2025-08-13 PROCEDURE — 81003 URINALYSIS AUTO W/O SCOPE: CPT | Performed by: UROLOGY

## 2025-08-13 PROCEDURE — 3075F SYST BP GE 130 - 139MM HG: CPT | Performed by: UROLOGY

## 2025-08-13 RX ORDER — SULFAMETHOXAZOLE AND TRIMETHOPRIM 800; 160 MG/1; MG/1
1 TABLET ORAL ONCE
Status: SHIPPED | OUTPATIENT
Start: 2025-08-13

## 2025-08-26 DIAGNOSIS — R05.3 CHRONIC COUGH: ICD-10-CM

## 2025-08-27 RX ORDER — OMEPRAZOLE 40 MG/1
40 CAPSULE, DELAYED RELEASE ORAL DAILY
Qty: 90 CAPSULE | Refills: 0 | OUTPATIENT
Start: 2025-08-27 | End: 2025-11-25

## (undated) DIAGNOSIS — R74.8 ELEVATED LIVER ENZYMES: Primary | ICD-10-CM

## (undated) DEVICE — ANCHOR TISSUE RETRIEVAL SYSTEM, BAG SIZE 125 ML, PORT SIZE 8 MM: Brand: ANCHOR TISSUE RETRIEVAL SYSTEM

## (undated) DEVICE — ZZ--CONVERTED-TO-500976-PENCIL SMK EVAC L10FT MPLR BLDE JAW OPN

## (undated) DEVICE — ARM DRAPE

## (undated) DEVICE — #11 STERILE BLADE: Brand: POLYMER COATED BLADES

## (undated) DEVICE — SUT PROL 4-0 36IN RB-1 NABSRB BLU 17MM 1/2 CI

## (undated) DEVICE — VIOLET BRAIDED (POLYGLACTIN 910), SYNTHETIC ABSORBABLE SUTURE: Brand: COATED VICRYL

## (undated) DEVICE — ANCHOR TISSUE RETRIEVAL SYSTEM, BAG SIZE 175 ML, PORT SIZE 10 MM: Brand: ANCHOR TISSUE RETRIEVAL SYSTEM

## (undated) DEVICE — VISUALIZATION SYSTEM: Brand: CLEARIFY

## (undated) DEVICE — POUCH SPECIMEN WIRE 6X3 250ML

## (undated) DEVICE — SEAL

## (undated) DEVICE — TROCAR: Brand: KII® SLEEVE

## (undated) DEVICE — SURGICEL SNOW HEMOSTAT 4X4IN ABSORBABLE

## (undated) DEVICE — #15 STERILE STAINLESS BLADE: Brand: STERILE STAINLESS BLADES

## (undated) DEVICE — TROCAR: Brand: KII FIOS FIRST ENTRY

## (undated) DEVICE — FENESTRATED BIPOLAR FORCEPS: Brand: ENDOWRIST

## (undated) DEVICE — Device: Brand: SUTURE PASSOR PRO

## (undated) DEVICE — TIP COVER ACCESSORY

## (undated) DEVICE — ADHESIVE SKIN TOP FOR WND CLSR DERMBND ADV

## (undated) DEVICE — L-HOOK CAUTERY PROBE TIP, DISPOSABLE: Brand: RENEW

## (undated) DEVICE — AIRSEAL 12 MM ACCESS PORT AND PALM GRIP OBTURATOR WITH BLADELESS OPTICAL TIP, 120 MM LENGTH: Brand: AIRSEAL

## (undated) DEVICE — LAPAROVUE VISIBILITY SYSTEM LAPAROSCOPIC SOLUTIONS: Brand: LAPAROVUE

## (undated) DEVICE — AIRSEAL TRI-LUMEN LILTERED TUBE SET: Brand: AIRSEAL

## (undated) DEVICE — CLIP APPLIER WITH CLIP LOGIC TECHNOLOGY: Brand: ENDO CLIP III

## (undated) DEVICE — SLEEVE COMPR MD KNEE LEN SGL USE KENDALL SCD

## (undated) DEVICE — SUT COAT VCRL 0 27IN CT-1 ABSRB UD 36MM 1/2

## (undated) DEVICE — ENDOCUT SCISSOR TIP, DISPOSABLE: Brand: RENEW

## (undated) DEVICE — ENDOPATH ULTRA VERESS INSUFFLATION NEEDLES WITH LUER LOCK CONNECTORS: Brand: ENDOPATH

## (undated) DEVICE — SUT MCRYL 4-0 18IN PS-2 ABSRB UD 19MM 3/8 CIR

## (undated) DEVICE — TROCAR: Brand: KII SHIELDED BLADED ACCESS SYSTEM

## (undated) DEVICE — MONOPOLAR CURVED SCISSORS: Brand: ENDOWRIST

## (undated) DEVICE — BINDER ABD UNISX 9IN 62IN L AND XL UNIV

## (undated) DEVICE — 2, DISPOSABLE SUCTION/IRRIGATOR WITHOUT DISPOSABLE TIP: Brand: STRYKEFLOW

## (undated) DEVICE — COVER,LIGHT,CAMERA,HARD,1/PK,STRL: Brand: MEDLINE

## (undated) DEVICE — DALE ABDOMINAL BINDER, 12" WIDE, STRETCHES TO FIT 30"-45", 1 PER BOX.: Brand: DALE ABDOMINAL BINDER

## (undated) DEVICE — INSUFFLATION NEEDLE TO ESTABLISH PNEUMOPERITONEUM.: Brand: INSUFFLATION NEEDLE

## (undated) DEVICE — GLOVE SUR 7.5 SENSICARE PI PIP CRM PWD F

## (undated) DEVICE — SOLUTION IV 1000ML DIL ST H2O

## (undated) DEVICE — ROBOTIC GENERAL: Brand: MEDLINE INDUSTRIES, INC.

## (undated) DEVICE — GLOVE SUR 8 SENSICARE PI PIP CRM PWD F

## (undated) DEVICE — LARGE NEEDLE DRIVER: Brand: ENDOWRIST

## (undated) DEVICE — TIBURON DRAPE TOWELS: Brand: CONVERTORS

## (undated) DEVICE — SUT COAT VCRL+ 0 27IN UR-6 ABSRB VLT ANTIBACT

## (undated) DEVICE — TRAY CATH 16FR F INCL BARDX IC COMPLT CARE

## (undated) DEVICE — DAVINCI XI CLIP HEM O LOK LARGE PURPLE

## (undated) DEVICE — SOLUTION IRRIG 1000ML 0.9% NACL USP BTL

## (undated) DEVICE — SUT COAT VCRL + 0 54IN ABSRB UD ANTIBACT

## (undated) DEVICE — TISSUE RETRIEVAL SYSTEM: Brand: INZII RETRIEVAL SYSTEM

## (undated) DEVICE — BLADELESS OBTURATOR: Brand: WECK VISTA

## (undated) DEVICE — SUT PDS II 0 L27IN ABSRB VLT L26MM CT-2

## (undated) DEVICE — SYRINGE MED 10ML LL TIP W/O SFTY DISP

## (undated) DEVICE — COLUMN DRAPE

## (undated) DEVICE — POWER SHELL: Brand: SIGNIA

## (undated) DEVICE — PROGRASP FORCEPS: Brand: ENDOWRIST

## (undated) DEVICE — GRABBER GRASPER TIP, DISPOSABLE: Brand: RENEW

## (undated) DEVICE — ABSORBABLE WOUND CLOSURE DEVICE: Brand: V-LOC 90

## (undated) DEVICE — ANTIBACTERIAL UNDYED BRAIDED (POLYGLACTIN 910), SYNTHETIC ABSORBABLE SUTURE: Brand: COATED VICRYL

## (undated) DEVICE — DRAPE,LAPAROTOMY,PCH,STERILE: Brand: MEDLINE

## (undated) DEVICE — TRADITIONAL MARYLAND DISSECTOR TIP, DISPOSABLE: Brand: RENEW

## (undated) NOTE — LETTER
Date: 3/26/2025    Patient Name: Nolan Martínez Jr.          To Whom it may concern:    This letter has been written at the patient's request. The above patient was seen at [facility] for treatment of a medical condition.    This patient's mom, Katherin Roman should be excused from attending work from 3/24/25 through 3/28/25. Katherin Roman was in [place] helping her son recover after surgery.             Sincerely,    JUAN Chun, CMSRN